# Patient Record
Sex: FEMALE | Race: BLACK OR AFRICAN AMERICAN | NOT HISPANIC OR LATINO | Employment: FULL TIME | URBAN - METROPOLITAN AREA
[De-identification: names, ages, dates, MRNs, and addresses within clinical notes are randomized per-mention and may not be internally consistent; named-entity substitution may affect disease eponyms.]

---

## 2024-02-09 ENCOUNTER — TELEPHONE (OUTPATIENT)
Dept: OBSTETRICS AND GYNECOLOGY | Facility: CLINIC | Age: 24
End: 2024-02-09
Payer: COMMERCIAL

## 2024-02-09 NOTE — TELEPHONE ENCOUNTER
Patient is requesting a refill of Valtex. She is a new patient for Dr. Hazel her appointment is not till 2/22/2024.

## 2024-02-12 ENCOUNTER — PHARMACY VISIT (OUTPATIENT)
Dept: PHARMACY | Facility: CLINIC | Age: 24
End: 2024-02-12
Payer: MEDICAID

## 2024-02-12 DIAGNOSIS — B00.9 HSV INFECTION: Primary | ICD-10-CM

## 2024-02-12 PROCEDURE — RXMED WILLOW AMBULATORY MEDICATION CHARGE

## 2024-02-12 RX ORDER — VALACYCLOVIR HYDROCHLORIDE 500 MG/1
TABLET, FILM COATED ORAL
Qty: 90 TABLET | Refills: 2 | Status: SHIPPED | OUTPATIENT
Start: 2024-02-12 | End: 2025-02-10

## 2024-02-12 NOTE — TELEPHONE ENCOUNTER
Called patient. No answer. Left voicemail. Voicemail stated Dr. Hazel would not refill prescription until she has her appt on the 22nd since she is a new patient but can call [phone] to see if Dr. Anglin would refill.

## 2024-02-22 ENCOUNTER — OFFICE VISIT (OUTPATIENT)
Dept: OBSTETRICS AND GYNECOLOGY | Facility: CLINIC | Age: 24
End: 2024-02-22
Payer: COMMERCIAL

## 2024-02-22 VITALS
SYSTOLIC BLOOD PRESSURE: 110 MMHG | BODY MASS INDEX: 22.82 KG/M2 | HEIGHT: 65 IN | DIASTOLIC BLOOD PRESSURE: 70 MMHG | WEIGHT: 137 LBS

## 2024-02-22 DIAGNOSIS — Z11.3 SCREEN FOR STD (SEXUALLY TRANSMITTED DISEASE): ICD-10-CM

## 2024-02-22 DIAGNOSIS — Z01.419 WELL WOMAN EXAM: Primary | ICD-10-CM

## 2024-02-22 DIAGNOSIS — R10.2 PELVIC PAIN: ICD-10-CM

## 2024-02-22 DIAGNOSIS — Z12.4 CERVICAL CANCER SCREENING: ICD-10-CM

## 2024-02-22 PROCEDURE — 88175 CYTOPATH C/V AUTO FLUID REDO: CPT

## 2024-02-22 PROCEDURE — 87661 TRICHOMONAS VAGINALIS AMPLIF: CPT

## 2024-02-22 PROCEDURE — 99385 PREV VISIT NEW AGE 18-39: CPT | Performed by: OBSTETRICS & GYNECOLOGY

## 2024-02-22 PROCEDURE — 1036F TOBACCO NON-USER: CPT | Performed by: OBSTETRICS & GYNECOLOGY

## 2024-02-22 PROCEDURE — 87800 DETECT AGNT MULT DNA DIREC: CPT

## 2024-02-22 ASSESSMENT — ENCOUNTER SYMPTOMS
SHORTNESS OF BREATH: 0
CONSTIPATION: 0
COLOR CHANGE: 0
SLEEP DISTURBANCE: 0
BACK PAIN: 0
FEVER: 0
NAUSEA: 0
ABDOMINAL PAIN: 0
HEMATURIA: 0
FATIGUE: 0
UNEXPECTED WEIGHT CHANGE: 0
VOMITING: 0
ABDOMINAL DISTENTION: 0
CHILLS: 0
DIARRHEA: 0
APPETITE CHANGE: 0
FLANK PAIN: 0
FREQUENCY: 0
DYSURIA: 0
BLOOD IN STOOL: 0

## 2024-02-22 ASSESSMENT — PAIN SCALES - GENERAL: PAINLEVEL: 0-NO PAIN

## 2024-02-22 NOTE — PROGRESS NOTES
"Abelardo Knapp is a 23 y.o.  here for well woman exam.    Medical and surgical histories reviewed with patient.     Concerns: concerned about LLQ pain with sex.  Has been going on \"for a few months\".  Does not happen every time she has sex.  Maybe positional. Has happened with multiple partners.     Exercise: none     GynHx:  Menarche: 8 yo  Cycles: regular cycles, no concerns   Sexually active: yes with men  Contraception: condoms sometimes   Pap History: denies h/o abnormal pap   STI History: h/o HSV - taking valacyclovir every other day as recommended by a previous provider    No LMP recorded.     Obstetric History  OB History    Para Term  AB Living   0 0 0 0 0 0   SAB IAB Ectopic Multiple Live Births   0 0 0 0 0        Past Medical History  She has a past medical history of Encounter for gynecological examination (general) (routine) without abnormal findings (2022), Encounter for immunization (2015), Encounter for immunization (2016), Encounter for immunization (10/31/2016), Herpes, and Other specified health status (2014).    Surgical History  She has a past surgical history that includes Other surgical history (2019).     Social History  She reports that she has never smoked. She has never used smokeless tobacco. She reports current alcohol use. She reports that she does not currently use drugs.    Family History  No family history on file.     Allergies  Patient has no known allergies.    Review of Systems   Constitutional:  Negative for appetite change, chills, fatigue, fever and unexpected weight change.   Respiratory:  Negative for shortness of breath.    Cardiovascular:  Negative for chest pain.   Gastrointestinal:  Negative for abdominal distention, abdominal pain, blood in stool, constipation, diarrhea, nausea and vomiting.   Endocrine: Negative for cold intolerance and heat intolerance.   Genitourinary:  Positive for pelvic pain (LLQ pain " "intermittently with sex). Negative for dyspareunia, dysuria, flank pain, frequency, genital sores, hematuria, menstrual problem, urgency, vaginal bleeding, vaginal discharge and vaginal pain.   Musculoskeletal:  Negative for back pain.   Skin:  Negative for color change.   Psychiatric/Behavioral:  Negative for sleep disturbance.        /70   Ht 1.651 m (5' 5\")   Wt 62.1 kg (137 lb)   BMI 22.80 kg/m²      Physical Exam  Constitutional:       Appearance: Normal appearance.   HENT:      Head: Normocephalic and atraumatic.   Chest:   Breasts:     Right: Normal.      Left: Normal.   Abdominal:      General: Abdomen is flat.      Palpations: Abdomen is soft.      Tenderness: There is no abdominal tenderness.   Genitourinary:     General: Normal vulva.      Vagina: Normal.      Cervix: Normal.      Uterus: Normal.       Adnexa: Right adnexa normal and left adnexa normal.      Comments: Pap collected today    Skin:     General: Skin is warm and dry.   Neurological:      Mental Status: She is alert and oriented to person, place, and time.   Psychiatric:         Mood and Affect: Mood normal.           Assessment and Plan:  Routine Well Woman Exam Today.   Discussed diet and exercise and routine health screening.   Pap: pap done today   GC/CT testing with pap smear    Contraception  Pt previously used depo provera injections. Declines to discuss options for birth control. Happy with condoms.     LLQ pain  TATV US ordered  No pain or significant findings on exam. Discussed possibilities with patient (possibly GI related or associated with certain time in menstrual cycle).  Likely not a major concern.     No orders of the defined types were placed in this encounter.     "

## 2024-02-24 LAB
C TRACH RRNA SPEC QL NAA+PROBE: NEGATIVE
N GONORRHOEA DNA SPEC QL PROBE+SIG AMP: NEGATIVE
T VAGINALIS RRNA SPEC QL NAA+PROBE: NEGATIVE

## 2024-03-05 LAB
CYTOLOGY CMNT CVX/VAG CYTO-IMP: NORMAL
LAB AP HPV GENOTYPE QUESTION: YES
LAB AP HPV HR: NORMAL
LAB AP PAP ADDITIONAL TESTS: NORMAL
LABORATORY COMMENT REPORT: NORMAL
PATH REPORT.TOTAL CANCER: NORMAL

## 2024-03-13 ENCOUNTER — HOSPITAL ENCOUNTER (OUTPATIENT)
Dept: RADIOLOGY | Facility: HOSPITAL | Age: 24
Discharge: HOME | End: 2024-03-13
Payer: COMMERCIAL

## 2024-03-13 DIAGNOSIS — R10.2 PELVIC PAIN: ICD-10-CM

## 2024-03-13 PROCEDURE — 76856 US EXAM PELVIC COMPLETE: CPT

## 2024-03-13 PROCEDURE — 76830 TRANSVAGINAL US NON-OB: CPT | Performed by: STUDENT IN AN ORGANIZED HEALTH CARE EDUCATION/TRAINING PROGRAM

## 2024-03-13 PROCEDURE — 76856 US EXAM PELVIC COMPLETE: CPT | Performed by: STUDENT IN AN ORGANIZED HEALTH CARE EDUCATION/TRAINING PROGRAM

## 2024-03-15 ENCOUNTER — TELEPHONE (OUTPATIENT)
Dept: OBSTETRICS AND GYNECOLOGY | Facility: CLINIC | Age: 24
End: 2024-03-15
Payer: COMMERCIAL

## 2024-03-15 NOTE — TELEPHONE ENCOUNTER
Called patient to discuss results.  Identified by name and .  Informed patient of her results. Patient expressed understanding, no questions or concerns at this time.  Encouraged to call the office should any arise.     COYD Diaz RN

## 2024-03-15 NOTE — TELEPHONE ENCOUNTER
Called patient to discuss results  Left vm for pt to return call    CODY Diaz RN      ----- Message from Heather LEA MD sent at 3/14/2024 10:17 PM EDT -----  Please let patient know she had a normal pelvic ultrasound. No reason for concerns.  Pap and STI testing also normal. Thanks.

## 2024-05-05 PROCEDURE — RXMED WILLOW AMBULATORY MEDICATION CHARGE

## 2024-05-10 ENCOUNTER — PHARMACY VISIT (OUTPATIENT)
Dept: PHARMACY | Facility: CLINIC | Age: 24
End: 2024-05-10
Payer: MEDICAID

## 2024-08-05 PROCEDURE — RXMED WILLOW AMBULATORY MEDICATION CHARGE

## 2024-08-08 ENCOUNTER — PHARMACY VISIT (OUTPATIENT)
Dept: PHARMACY | Facility: CLINIC | Age: 24
End: 2024-08-08
Payer: MEDICAID

## 2024-09-23 ENCOUNTER — TELEPHONE (OUTPATIENT)
Dept: OBSTETRICS AND GYNECOLOGY | Facility: CLINIC | Age: 24
End: 2024-09-23
Payer: COMMERCIAL

## 2024-09-23 NOTE — TELEPHONE ENCOUNTER
Called patient to discuss symptoms she is experiencing  Left vm for patient to return call.    NHI DiazN RN

## 2024-09-23 NOTE — TELEPHONE ENCOUNTER
Patient has an upcoming appt. for missed period come late october. Patient took two pregnancy test yesterday and recieved a postivie result and now she has a fever, runny nose, body aches and not sure how to treat it. Patient has not taken her temp. Or hasn't had a covid test. [facility] pharmacy [phone]

## 2024-09-23 NOTE — TELEPHONE ENCOUNTER
Lm to inform the pt that I've received her message to schedule an appt with  and address her other concerns. Asked the pt to call the office.

## 2024-10-23 ENCOUNTER — APPOINTMENT (OUTPATIENT)
Dept: OBSTETRICS AND GYNECOLOGY | Facility: CLINIC | Age: 24
End: 2024-10-23
Payer: COMMERCIAL

## 2024-10-23 ENCOUNTER — APPOINTMENT (OUTPATIENT)
Dept: LAB | Facility: LAB | Age: 24
End: 2024-10-23
Payer: COMMERCIAL

## 2024-10-23 VITALS
WEIGHT: 147 LBS | SYSTOLIC BLOOD PRESSURE: 104 MMHG | HEIGHT: 64 IN | BODY MASS INDEX: 25.1 KG/M2 | DIASTOLIC BLOOD PRESSURE: 60 MMHG

## 2024-10-23 DIAGNOSIS — N92.6 MISSED PERIOD: Primary | ICD-10-CM

## 2024-10-23 DIAGNOSIS — Z34.00 SUPERVISION OF NORMAL FIRST PREGNANCY, ANTEPARTUM (HHS-HCC): ICD-10-CM

## 2024-10-23 PROBLEM — Z3A.08 8 WEEKS GESTATION OF PREGNANCY (HHS-HCC): Status: ACTIVE | Noted: 2024-10-23

## 2024-10-23 PROBLEM — B00.9 HSV-2 INFECTION: Status: ACTIVE | Noted: 2024-10-23

## 2024-10-23 LAB — PREGNANCY TEST URINE, POC: POSITIVE

## 2024-10-23 PROCEDURE — 87086 URINE CULTURE/COLONY COUNT: CPT

## 2024-10-23 PROCEDURE — 87491 CHLMYD TRACH DNA AMP PROBE: CPT

## 2024-10-23 PROCEDURE — 99214 OFFICE O/P EST MOD 30 MIN: CPT | Performed by: OBSTETRICS & GYNECOLOGY

## 2024-10-23 PROCEDURE — 3008F BODY MASS INDEX DOCD: CPT | Performed by: OBSTETRICS & GYNECOLOGY

## 2024-10-23 PROCEDURE — 87661 TRICHOMONAS VAGINALIS AMPLIF: CPT

## 2024-10-23 PROCEDURE — 87591 N.GONORRHOEAE DNA AMP PROB: CPT

## 2024-10-23 PROCEDURE — 1036F TOBACCO NON-USER: CPT | Performed by: OBSTETRICS & GYNECOLOGY

## 2024-10-23 PROCEDURE — 81025 URINE PREGNANCY TEST: CPT | Performed by: OBSTETRICS & GYNECOLOGY

## 2024-10-23 ASSESSMENT — ENCOUNTER SYMPTOMS
ABDOMINAL PAIN: 0
DIARRHEA: 0
BLOOD IN STOOL: 0
CONSTIPATION: 0
UNEXPECTED WEIGHT CHANGE: 0
SLEEP DISTURBANCE: 0
FLANK PAIN: 0
FATIGUE: 0
FREQUENCY: 0
SHORTNESS OF BREATH: 0
FEVER: 0
APPETITE CHANGE: 0
VOMITING: 0
NAUSEA: 1
DYSURIA: 0
ABDOMINAL DISTENTION: 0
HEMATURIA: 0
COLOR CHANGE: 0
CHILLS: 0
BACK PAIN: 0

## 2024-10-23 ASSESSMENT — EDINBURGH POSTNATAL DEPRESSION SCALE (EPDS)
I HAVE BEEN ANXIOUS OR WORRIED FOR NO GOOD REASON: NO, NOT AT ALL
I HAVE LOOKED FORWARD WITH ENJOYMENT TO THINGS: AS MUCH AS I EVER DID
TOTAL SCORE: 0
I HAVE BEEN ABLE TO LAUGH AND SEE THE FUNNY SIDE OF THINGS: AS MUCH AS I ALWAYS COULD
I HAVE BEEN SO UNHAPPY THAT I HAVE BEEN CRYING: NO, NEVER
THE THOUGHT OF HARMING MYSELF HAS OCCURRED TO ME: NEVER
THINGS HAVE BEEN GETTING ON TOP OF ME: NO, I HAVE BEEN COPING AS WELL AS EVER
I HAVE BLAMED MYSELF UNNECESSARILY WHEN THINGS WENT WRONG: NO, NEVER
I HAVE BEEN SO UNHAPPY THAT I HAVE HAD DIFFICULTY SLEEPING: NOT AT ALL
I HAVE FELT SAD OR MISERABLE: NO, NOT AT ALL
I HAVE FELT SCARED OR PANICKY FOR NO GOOD REASON: NO, NOT AT ALL

## 2024-10-23 ASSESSMENT — PAIN SCALES - GENERAL: PAINLEVEL_OUTOF10: 0-NO PAIN

## 2024-10-23 NOTE — ASSESSMENT & PLAN NOTE
Desired provider in labor: [] CNM  [x] Physician  [] Blood Products: [] Yes, accepts [] No, needs counseling  [x] Initial BMI: Could not be calculated   [] Prenatal Labs:   [] Cervical Cancer Screening up to date  [x] Rh status: A+  [] Genetic Screening:    [] NT US: (11-13 wks)  [] Baby ASA (if indicated):  [] Pregnancy dated by:  LMP agrees with 8 week US    [] Anatomy US: (19-20 wks)  [] Federal Sterilization consent signed (if indicated):  [] 1hr GCT at 24-28wks:  [] Rhogam (if indicated):   [] Fetal Surveillance (if indicated):  [] Tdap (27-32 wks, may be given up to 36 wks if initial window missed):   [] RSV (32-36 wks) (Sept. to end of Jan):   [] Flu Vaccine:    [] Breastfeeding:  [] Postpartum Birth control method:   [] GBS at 36 - 37 wks:  [] 39 weeks discussion of IOL vs. Expectant management:  [] Mode of delivery ( anticipated ):

## 2024-10-23 NOTE — PROGRESS NOTES
Subjective   Patient ID [id number]   Abelardo Knapp is a 24 y.o.  at 8w4d with a working estimated date of delivery of 2025, by Last Menstrual Period who presents for an initial prenatal visit.   Pt has some nausea. No vomiting.  Had some spotting a few days ago and reports no significant findings during [facility] ED visit. (Blood type A+)    Her pregnancy is complicated by:  HSV - currently using valtrex intermittently for outbreaks     OB History    Para Term  AB Living   1 0 0 0 0 0   SAB IAB Ectopic Multiple Live Births   0 0 0 0 0      # Outcome Date GA Lbr Aron/2nd Weight Sex Type Anes PTL Lv   1 Current              [place]  Depression Scale Total: 0    Objective   Physical Exam  Weight: 66.7 kg (147 lb)  Expected Total Weight Gain: 11.5 kg (25 lb)-16 kg (35 lb)   Pregravid BMI: 23.50  BP: 104/60    Fetal Heart Rate: +     Review of Systems   Constitutional:  Negative for appetite change, chills, fatigue, fever and unexpected weight change.   Respiratory:  Negative for shortness of breath.    Cardiovascular:  Negative for chest pain.   Gastrointestinal:  Positive for nausea. Negative for abdominal distention, abdominal pain, blood in stool, constipation, diarrhea and vomiting.   Endocrine: Negative for cold intolerance and heat intolerance.   Genitourinary:  Negative for dyspareunia, dysuria, flank pain, frequency, genital sores, hematuria, menstrual problem, pelvic pain, urgency, vaginal bleeding, vaginal discharge and vaginal pain.   Musculoskeletal:  Negative for back pain.   Skin:  Negative for color change.   Psychiatric/Behavioral:  Negative for sleep disturbance.    '  Physical Exam  Constitutional:       Appearance: Normal appearance.   Abdominal:      General: Abdomen is flat.      Palpations: Abdomen is soft.      Tenderness: There is no abdominal tenderness.   Genitourinary:     General: Normal vulva.   Skin:     General: Skin is warm and dry.   Neurological:       Mental Status: She is alert.   Psychiatric:         Mood and Affect: Mood normal.         Behavior: Behavior normal.       TATV US in office:  -gestational sac in uterus  -yolk sac visualized and wnl  -fetal pole present and cardiac activity subjectively normal   -CRL c/w 9w 0d     Prenatal Labs  ordered    Assessment/Plan         Prenatal Labs ordered  Continue daily PNV  Pt would like NIPS and NT US - both are ordered and patient given instruction on how to schedule  Discussed diet, exercise, office visits, normal OB symptoms    Follow up in 4 weeks for return OB visit.

## 2024-10-24 LAB
BACTERIA UR CULT: NO GROWTH
C TRACH RRNA SPEC QL NAA+PROBE: NEGATIVE
N GONORRHOEA DNA SPEC QL PROBE+SIG AMP: NEGATIVE
T VAGINALIS RRNA SPEC QL NAA+PROBE: NEGATIVE

## 2024-11-05 ENCOUNTER — LAB (OUTPATIENT)
Dept: LAB | Facility: LAB | Age: 24
End: 2024-11-05
Payer: COMMERCIAL

## 2024-11-05 DIAGNOSIS — Z34.00 SUPERVISION OF NORMAL FIRST PREGNANCY, ANTEPARTUM (HHS-HCC): ICD-10-CM

## 2024-11-05 LAB
ERYTHROCYTE [DISTWIDTH] IN BLOOD BY AUTOMATED COUNT: 12.3 % (ref 11.5–14.5)
HCT VFR BLD AUTO: 38.6 % (ref 36–46)
HGB BLD-MCNC: 13.4 G/DL (ref 12–16)
MCH RBC QN AUTO: 30.6 PG (ref 26–34)
MCHC RBC AUTO-ENTMCNC: 34.7 G/DL (ref 32–36)
MCV RBC AUTO: 88 FL (ref 80–100)
NRBC BLD-RTO: 0 /100 WBCS (ref 0–0)
PLATELET # BLD AUTO: 195 X10*3/UL (ref 150–450)
RBC # BLD AUTO: 4.38 X10*6/UL (ref 4–5.2)
WBC # BLD AUTO: 5.6 X10*3/UL (ref 4.4–11.3)

## 2024-11-05 PROCEDURE — 86901 BLOOD TYPING SEROLOGIC RH(D): CPT

## 2024-11-05 PROCEDURE — 83021 HEMOGLOBIN CHROMOTOGRAPHY: CPT

## 2024-11-05 PROCEDURE — 86803 HEPATITIS C AB TEST: CPT

## 2024-11-05 PROCEDURE — 87340 HEPATITIS B SURFACE AG IA: CPT

## 2024-11-05 PROCEDURE — 86850 RBC ANTIBODY SCREEN: CPT

## 2024-11-05 PROCEDURE — 87389 HIV-1 AG W/HIV-1&-2 AB AG IA: CPT

## 2024-11-05 PROCEDURE — 83020 HEMOGLOBIN ELECTROPHORESIS: CPT | Performed by: OBSTETRICS & GYNECOLOGY

## 2024-11-05 PROCEDURE — 36415 COLL VENOUS BLD VENIPUNCTURE: CPT

## 2024-11-05 PROCEDURE — 86780 TREPONEMA PALLIDUM: CPT

## 2024-11-05 PROCEDURE — 85027 COMPLETE CBC AUTOMATED: CPT

## 2024-11-05 PROCEDURE — 86317 IMMUNOASSAY INFECTIOUS AGENT: CPT

## 2024-11-05 PROCEDURE — 86900 BLOOD TYPING SEROLOGIC ABO: CPT

## 2024-11-06 LAB
ABO GROUP (TYPE) IN BLOOD: NORMAL
ANTIBODY SCREEN: NORMAL
HBV SURFACE AG SERPL QL IA: NONREACTIVE
HCV AB SER QL: NONREACTIVE
HEMOGLOBIN A2: 3 % (ref 2–3.5)
HEMOGLOBIN A: 96.7 % (ref 95.8–98)
HEMOGLOBIN F: 0.3 % (ref 0–2)
HEMOGLOBIN IDENTIFICATION INTERPRETATION: NORMAL
HIV 1+2 AB+HIV1 P24 AG SERPL QL IA: NONREACTIVE
PATH REVIEW-HGB IDENTIFICATION: NORMAL
RH FACTOR (ANTIGEN D): NORMAL
RUBV IGG SERPL IA-ACNC: 3.4 IA
RUBV IGG SERPL QL IA: POSITIVE
TREPONEMA PALLIDUM IGG+IGM AB [PRESENCE] IN SERUM OR PLASMA BY IMMUNOASSAY: NONREACTIVE

## 2024-11-13 LAB
COMMENTS - MP RESULT TYPE: NORMAL
SCAN RESULT: NORMAL

## 2024-11-20 ENCOUNTER — APPOINTMENT (OUTPATIENT)
Dept: OBSTETRICS AND GYNECOLOGY | Facility: CLINIC | Age: 24
End: 2024-11-20
Payer: COMMERCIAL

## 2024-12-02 ENCOUNTER — APPOINTMENT (OUTPATIENT)
Dept: OBSTETRICS AND GYNECOLOGY | Facility: CLINIC | Age: 24
End: 2024-12-02
Payer: COMMERCIAL

## 2024-12-02 VITALS — SYSTOLIC BLOOD PRESSURE: 120 MMHG | DIASTOLIC BLOOD PRESSURE: 60 MMHG | WEIGHT: 151.6 LBS | BODY MASS INDEX: 26.02 KG/M2

## 2024-12-02 DIAGNOSIS — Z34.00 SUPERVISION OF NORMAL FIRST PREGNANCY, ANTEPARTUM (HHS-HCC): ICD-10-CM

## 2024-12-02 DIAGNOSIS — N89.8 VAGINAL ITCHING: ICD-10-CM

## 2024-12-02 DIAGNOSIS — Z3A.14 14 WEEKS GESTATION OF PREGNANCY (HHS-HCC): Primary | ICD-10-CM

## 2024-12-02 PROCEDURE — RXMED WILLOW AMBULATORY MEDICATION CHARGE

## 2024-12-02 PROCEDURE — 90471 IMMUNIZATION ADMIN: CPT | Performed by: OBSTETRICS & GYNECOLOGY

## 2024-12-02 PROCEDURE — 90656 IIV3 VACC NO PRSV 0.5 ML IM: CPT | Performed by: OBSTETRICS & GYNECOLOGY

## 2024-12-02 PROCEDURE — 99213 OFFICE O/P EST LOW 20 MIN: CPT | Performed by: OBSTETRICS & GYNECOLOGY

## 2024-12-02 PROCEDURE — 87205 SMEAR GRAM STAIN: CPT

## 2024-12-02 RX ORDER — NAPROXEN SODIUM 220 MG/1
162 TABLET, FILM COATED ORAL DAILY
Qty: 180 TABLET | Refills: 3 | Status: SHIPPED | OUTPATIENT
Start: 2024-12-02 | End: 2025-12-02

## 2024-12-02 RX ORDER — ONDANSETRON 4 MG/1
4 TABLET, FILM COATED ORAL EVERY 8 HOURS PRN
Qty: 15 TABLET | Refills: 1 | Status: SHIPPED | OUTPATIENT
Start: 2024-12-02

## 2024-12-02 NOTE — PROGRESS NOTES
Pt currently 14w2d  Pt continues to have nausea but no vomiting.  Occasional headaches, using tylenol.  Some vaginal itching and discharge, concern for yeast infection.  Normal fetal movement. Denies ctx, LOF, VB.     Problem List Items Addressed This Visit          Active Problems    14 weeks gestation of pregnancy ([facility])     Other Visit Diagnoses       Vaginal itching    -  Primary    Relevant Orders    Vaginitis Gram Stain For Bacterial Vaginosis + Yeast    Supervision of normal first pregnancy, antepartum ([facility])        Relevant Medications    ondansetron (Zofran) 4 mg tablet    aspirin 81 mg chewable tablet    Other Relevant Orders    US MAC OB imaging order

## 2024-12-03 ENCOUNTER — TELEPHONE (OUTPATIENT)
Dept: OBSTETRICS AND GYNECOLOGY | Facility: CLINIC | Age: 24
End: 2024-12-03
Payer: COMMERCIAL

## 2024-12-03 ENCOUNTER — PHARMACY VISIT (OUTPATIENT)
Dept: PHARMACY | Facility: CLINIC | Age: 24
End: 2024-12-03
Payer: MEDICAID

## 2024-12-03 LAB
CLUE CELLS VAG LPF-#/AREA: PRESENT /[LPF]
NUGENT SCORE: 8
YEAST VAG WET PREP-#/AREA: PRESENT

## 2024-12-03 NOTE — TELEPHONE ENCOUNTER
Called patient to discuss results and Zeushart message  Left vm for patient to return call.    NHI DiazN RN

## 2024-12-03 NOTE — TELEPHONE ENCOUNTER
Called patient to discuss results  Left vm for patient to return call.    CODY Diaz RN    ----- Message from Heather Hazel sent at 12/3/2024 11:43 AM EST -----  Pt testing positive for yeast and bacteria.  Will send rx for fluconazole for yeast. Can you see if patient would prefer oral or vaginal metronidazole?  (She is pregnant and both are safe)  Thanks

## 2024-12-04 ENCOUNTER — TELEPHONE (OUTPATIENT)
Dept: OBSTETRICS AND GYNECOLOGY | Facility: CLINIC | Age: 24
End: 2024-12-04
Payer: COMMERCIAL

## 2024-12-04 DIAGNOSIS — N89.8 VAGINAL DISCHARGE: ICD-10-CM

## 2024-12-04 NOTE — TELEPHONE ENCOUNTER
Called patient to discuss results and follow up  Informed patient via vm about results and discussed tx options, number given to return call  Left vm for patient to return call.    NHI DiazN RN     Please disregard this encounter. She is no longer on Warfarin per documentation. She was prescribed Eliquis on 9/16/22.

## 2024-12-05 ENCOUNTER — PHARMACY VISIT (OUTPATIENT)
Dept: PHARMACY | Facility: CLINIC | Age: 24
End: 2024-12-05
Payer: MEDICAID

## 2024-12-05 DIAGNOSIS — B37.9 YEAST INFECTION: Primary | ICD-10-CM

## 2024-12-05 PROCEDURE — RXMED WILLOW AMBULATORY MEDICATION CHARGE

## 2024-12-05 RX ORDER — METRONIDAZOLE 500 MG/1
500 TABLET ORAL 2 TIMES DAILY
Qty: 14 TABLET | Refills: 0 | Status: SHIPPED | OUTPATIENT
Start: 2024-12-05 | End: 2024-12-12

## 2024-12-05 RX ORDER — FLUCONAZOLE 150 MG/1
150 TABLET ORAL ONCE
Qty: 2 TABLET | Refills: 0 | Status: SHIPPED | OUTPATIENT
Start: 2024-12-05 | End: 2024-12-06

## 2024-12-30 ENCOUNTER — APPOINTMENT (OUTPATIENT)
Dept: OBSTETRICS AND GYNECOLOGY | Facility: CLINIC | Age: 24
End: 2024-12-30
Payer: COMMERCIAL

## 2024-12-30 VITALS — BODY MASS INDEX: 27.46 KG/M2 | SYSTOLIC BLOOD PRESSURE: 120 MMHG | DIASTOLIC BLOOD PRESSURE: 70 MMHG | WEIGHT: 160 LBS

## 2024-12-30 DIAGNOSIS — O99.612 CONSTIPATION DURING PREGNANCY IN SECOND TRIMESTER (HHS-HCC): ICD-10-CM

## 2024-12-30 DIAGNOSIS — K59.00 CONSTIPATION DURING PREGNANCY IN SECOND TRIMESTER (HHS-HCC): ICD-10-CM

## 2024-12-30 DIAGNOSIS — Z3A.18 18 WEEKS GESTATION OF PREGNANCY (HHS-HCC): Primary | ICD-10-CM

## 2024-12-30 DIAGNOSIS — B00.9 HSV (HERPES SIMPLEX VIRUS) INFECTION: ICD-10-CM

## 2024-12-30 PROCEDURE — 99213 OFFICE O/P EST LOW 20 MIN: CPT | Performed by: OBSTETRICS & GYNECOLOGY

## 2024-12-30 RX ORDER — DOCUSATE SODIUM 100 MG/1
100 CAPSULE, LIQUID FILLED ORAL 2 TIMES DAILY PRN
Qty: 60 CAPSULE | Refills: 4 | Status: SHIPPED | OUTPATIENT
Start: 2024-12-30

## 2024-12-30 NOTE — PROGRESS NOTES
Pt currently 18w2d  Pt has no complaints.    Started feeling movement last week.   Denies ctx, LOF, VB.     Problem List Items Addressed This Visit          Active Problems    HSV (herpes simplex virus) infection    18 weeks gestation of pregnancy ([facility]) - Primary     Desired provider in labor: [] CNM  [] Physician  [] Blood Products: [] Yes, accepts [] No, needs counseling  [x] Initial BMI: 23.50   [x] Prenatal Labs:  normal  [] Cervical Cancer Screening up to date  [x] Rh status: positive  [x] Genetic Screening:  NIPS wnl, sex = female   [] NT US: (11-13 wks): missed scheduling   [x] Baby ASA (if indicated): start 12/2  [x] Pregnancy dated by:  LMP and early US    [] Anatomy US: (19-20 wks)  [] Federal Sterilization consent signed (if indicated):  [] 1hr GCT at 24-28wks:  [] Rhogam (if indicated):   [] Fetal Surveillance (if indicated):  [] Tdap (27-32 wks, may be given up to 36 wks if initial window missed):   [] RSV (32-36 wks) (Sept. to end of Jan):   [x] Flu Vaccine: accepted 12/2/24    [] Breastfeeding:  [] Postpartum Birth control method:   [] GBS at 36 - 37 wks:  [] 39 weeks discussion of IOL vs. Expectant management:  [] Mode of delivery ( anticipated ):         Constipation during pregnancy in second trimester ([facility])    Relevant Medications    docusate sodium (Colace) 100 mg capsule     Anatomy US scheduled 1/8/25

## 2024-12-30 NOTE — ASSESSMENT & PLAN NOTE
Desired provider in labor: [] CNM  [] Physician  [] Blood Products: [] Yes, accepts [] No, needs counseling  [x] Initial BMI: 23.50   [x] Prenatal Labs:  normal  [] Cervical Cancer Screening up to date  [x] Rh status: positive  [x] Genetic Screening:  NIPS wnl, sex = female   [] NT US: (11-13 wks): missed scheduling   [x] Baby ASA (if indicated): start 12/2  [x] Pregnancy dated by:  LMP and early US    [] Anatomy US: (19-20 wks)  [] Federal Sterilization consent signed (if indicated):  [] 1hr GCT at 24-28wks:  [] Rhogam (if indicated):   [] Fetal Surveillance (if indicated):  [] Tdap (27-32 wks, may be given up to 36 wks if initial window missed):   [] RSV (32-36 wks) (Sept. to end of Jan):   [x] Flu Vaccine: accepted 12/2/24    [] Breastfeeding:  [] Postpartum Birth control method:   [] GBS at 36 - 37 wks:  [] 39 weeks discussion of IOL vs. Expectant management:  [] Mode of delivery ( anticipated ):

## 2025-01-08 ENCOUNTER — HOSPITAL ENCOUNTER (OUTPATIENT)
Dept: RADIOLOGY | Facility: HOSPITAL | Age: 25
Discharge: HOME | End: 2025-01-08
Payer: COMMERCIAL

## 2025-01-08 DIAGNOSIS — Z03.73 FETAL ANOMALY SUSPECTED BUT NOT FOUND: ICD-10-CM

## 2025-01-08 DIAGNOSIS — Z34.00 SUPERVISION OF NORMAL FIRST PREGNANCY, ANTEPARTUM (HHS-HCC): ICD-10-CM

## 2025-01-08 PROCEDURE — RXMED WILLOW AMBULATORY MEDICATION CHARGE

## 2025-01-08 PROCEDURE — 76805 OB US >/= 14 WKS SNGL FETUS: CPT | Performed by: STUDENT IN AN ORGANIZED HEALTH CARE EDUCATION/TRAINING PROGRAM

## 2025-01-08 PROCEDURE — 76805 OB US >/= 14 WKS SNGL FETUS: CPT

## 2025-01-09 ENCOUNTER — PHARMACY VISIT (OUTPATIENT)
Dept: PHARMACY | Facility: CLINIC | Age: 25
End: 2025-01-09
Payer: MEDICAID

## 2025-01-13 ENCOUNTER — TELEPHONE (OUTPATIENT)
Dept: OBSTETRICS AND GYNECOLOGY | Facility: CLINIC | Age: 25
End: 2025-01-13
Payer: COMMERCIAL

## 2025-01-13 NOTE — TELEPHONE ENCOUNTER
Called patient to discuss symptoms she is experiencing  Identified by name and   Inquired about patient's symptoms  Tension in R leg, R leg is more swollen than left   Swelling is more recent  Back pain was so bad yesterday she couldn't sit down  Been having some mild shortness or breath, only really when she feels like she is doing too much  Advised patient go to ER or L&D triage for evaluation to rule out any concerns  Patient states she may wait until after work but if pain gets bad or SOB worsens she will go  Advised that patient go sooner than later for evaluation and to have someone drive her  Patient verbalized understanding, all questions/concerns addressed at this time.    Louisa Díaz, NHIN RN

## 2025-01-20 ENCOUNTER — HOSPITAL ENCOUNTER (OUTPATIENT)
Facility: HOSPITAL | Age: 25
End: 2025-01-20
Attending: OBSTETRICS & GYNECOLOGY | Admitting: OBSTETRICS & GYNECOLOGY
Payer: COMMERCIAL

## 2025-01-20 ENCOUNTER — HOSPITAL ENCOUNTER (OUTPATIENT)
Facility: HOSPITAL | Age: 25
Discharge: HOME | End: 2025-01-20
Attending: OBSTETRICS & GYNECOLOGY | Admitting: OBSTETRICS & GYNECOLOGY
Payer: COMMERCIAL

## 2025-01-20 VITALS
OXYGEN SATURATION: 100 % | HEART RATE: 83 BPM | WEIGHT: 175.93 LBS | BODY MASS INDEX: 30.04 KG/M2 | TEMPERATURE: 98.1 F | RESPIRATION RATE: 16 BRPM | SYSTOLIC BLOOD PRESSURE: 123 MMHG | HEIGHT: 64 IN | DIASTOLIC BLOOD PRESSURE: 68 MMHG

## 2025-01-20 DIAGNOSIS — O12.02 SWELLING OF LOWER EXTREMITY DURING PREGNANCY IN SECOND TRIMESTER (HHS-HCC): Primary | ICD-10-CM

## 2025-01-20 LAB
POC APPEARANCE, URINE: CLEAR
POC BILIRUBIN, URINE: NEGATIVE
POC BLOOD, URINE: NEGATIVE
POC COLOR, URINE: YELLOW
POC GLUCOSE, URINE: NEGATIVE MG/DL
POC KETONES, URINE: NEGATIVE MG/DL
POC LEUKOCYTES, URINE: ABNORMAL
POC NITRITE,URINE: NEGATIVE
POC PH, URINE: 6 PH
POC PROTEIN, URINE: NEGATIVE MG/DL
POC SPECIFIC GRAVITY, URINE: 1.02
POC UROBILINOGEN, URINE: 0.2 EU/DL

## 2025-01-20 PROCEDURE — 99214 OFFICE O/P EST MOD 30 MIN: CPT

## 2025-01-20 PROCEDURE — 81002 URINALYSIS NONAUTO W/O SCOPE: CPT | Performed by: FAMILY MEDICINE

## 2025-01-20 PROCEDURE — 4500999001 HC ED NO CHARGE

## 2025-01-20 PROCEDURE — 99213 OFFICE O/P EST LOW 20 MIN: CPT | Performed by: FAMILY MEDICINE

## 2025-01-20 RX ORDER — LIDOCAINE HYDROCHLORIDE 10 MG/ML
0.5 INJECTION, SOLUTION INFILTRATION; PERINEURAL ONCE AS NEEDED
Status: DISCONTINUED | OUTPATIENT
Start: 2025-01-20 | End: 2025-01-20 | Stop reason: HOSPADM

## 2025-01-20 RX ORDER — HYDRALAZINE HYDROCHLORIDE 20 MG/ML
5 INJECTION INTRAMUSCULAR; INTRAVENOUS ONCE AS NEEDED
Status: DISCONTINUED | OUTPATIENT
Start: 2025-01-20 | End: 2025-01-20 | Stop reason: HOSPADM

## 2025-01-20 RX ORDER — NIFEDIPINE 10 MG/1
10 CAPSULE ORAL ONCE AS NEEDED
Status: DISCONTINUED | OUTPATIENT
Start: 2025-01-20 | End: 2025-01-20 | Stop reason: HOSPADM

## 2025-01-20 RX ORDER — LABETALOL HYDROCHLORIDE 5 MG/ML
20 INJECTION, SOLUTION INTRAVENOUS ONCE AS NEEDED
Status: DISCONTINUED | OUTPATIENT
Start: 2025-01-20 | End: 2025-01-20 | Stop reason: HOSPADM

## 2025-01-20 ASSESSMENT — PAIN SCALES - GENERAL: PAINLEVEL_OUTOF10: 0 - NO PAIN

## 2025-01-20 NOTE — H&P
OB Triage H&P    ASSESSMENT/PLAN    Abelardo Knapp is a 24 y.o.  at 21w2d, dated by LMP c/w 8w4d US. Patient receives prenatal care with Dr Hazel. She presents to OB triage with LE swelling.    Intermittent BLE swelling, numbness/tingling in hands  - equal edema in BLE after long shifts at work  - low c/f DVT in absence of erythema, pain, warmth, fevers and intermittent sx which worsen with prolonged standing/walking  - VSS, SpO2 100% on RA  - discussed adequate hydration, use of compression stockings and consideration of wrist splints  - order for compression stockings and belly band placed  - discussed warning s/sx to return to triage or ED    IUP at 21w2d  -   - good fetal movement  - continue routine prenatal care  - next appt  anatomy scan,  PNV    Pregnancy n/f  - HSV    Dispo  -Patient appropriate for discharge home, agrees with plan  -Return precautions discussed   -Follow up at next scheduled OB appointment or to triage sooner as needed    -Discussed plan and reviewed with: Dr Hein      SUBJECTIVE  Abelardo Knapp presents for intermittent LE swelling x1 week. She notes the swelling is the worst after prolonged standing/walking at her job. Denies CP/SOB. Denies history of blood clots. Recently traveled via car to [place] about 2-3 wks ago. Denies pain, erythema, warmth, fever/chills. She also has been experiencing intermittent hand numbness/tingling, worse in the morning.    Good fetal movement. Denies vaginal bleeding., Denies contractions., Denies leaking of fluid.      Prenatal Provider Dr Hazel      Pregnancy Problems (from 10/23/24 to present)       Problem Noted Diagnosed Resolved    Constipation during pregnancy in second trimester ([facility]) 2024 by Heather LEA MD  No    Priority:  Medium       18 weeks gestation of pregnancy ([facility]) 10/23/2024 by Heather LEA MD  No    Priority:  Medium               OB History    Para Term  AB  Living   1 0 0 0 0 0   SAB IAB Ectopic Multiple Live Births   0 0 0 0 0      # Outcome Date GA Lbr Aron/2nd Weight Sex Type Anes PTL Lv   1 Current                Past Surgical History:   Procedure Laterality Date    OTHER SURGICAL HISTORY  08/08/2019    No history of surgery       Social History     Tobacco Use    Smoking status: Never    Smokeless tobacco: Never   Substance Use Topics    Alcohol use: Yes     Comment: occasionally       No Known Allergies    Medications Prior to Admission   Medication Sig Dispense Refill Last Dose/Taking    aspirin 81 mg chewable tablet Chew 2 tablets (162 mg) once daily. 180 tablet 3 1/19/2025 Evening    prenatal vitamin, iron-folic, 27 mg iron-800 mcg folic acid tablet Take 1 tablet by mouth once daily. 90 tablet 2 1/19/2025 Evening    valACYclovir (Valtrex) 500 mg tablet TAKE 1 TABLET TWICE DAILY FOR 3 DAYS DURING OUTBREAKS FOLLOWED BY 1 PILL DAILY 90 tablet 2 1/19/2025 Evening    docusate sodium (Colace) 100 mg capsule Take 1 capsule (100 mg) by mouth 2 times a day as needed for constipation. 60 capsule 4 Unknown    ondansetron (Zofran) 4 mg tablet Take 1 tablet (4 mg) by mouth every 8 hours if needed for nausea or vomiting. 15 tablet 1 Unknown    PRENATAL 2-IRON-FOLIC ACID-OM3 ORAL Take by mouth.   Unknown       OBJECTIVE    Last Vitals  Temp Pulse Resp BP MAP O2 Sat     90 16 130/78   100 %       Physical Exam  Physical Exam  Constitutional:       Appearance: Normal appearance.   HENT:      Head: Normocephalic.   Musculoskeletal:      Comments: No tenderness to palpation, warmth, redness, pitting edema in lower extremities  Normal pedal pulses, sensation intact   Neurological:      Mental Status: She is alert and oriented to person, place, and time.       Fetal Monitoring  FHT - 150bpm per doppler by RN    LABS  Labs in chart were reviewed.

## 2025-01-23 ENCOUNTER — HOSPITAL ENCOUNTER (OUTPATIENT)
Dept: RADIOLOGY | Facility: HOSPITAL | Age: 25
Discharge: HOME | End: 2025-01-23
Payer: COMMERCIAL

## 2025-01-23 ENCOUNTER — PHARMACY VISIT (OUTPATIENT)
Dept: PHARMACY | Facility: CLINIC | Age: 25
End: 2025-01-23
Payer: MEDICAID

## 2025-01-23 DIAGNOSIS — B00.9 HSV INFECTION: ICD-10-CM

## 2025-01-23 DIAGNOSIS — Z34.00 SUPERVISION OF NORMAL FIRST PREGNANCY, ANTEPARTUM (HHS-HCC): ICD-10-CM

## 2025-01-23 DIAGNOSIS — Z34.92 ENCNTR FOR SUPRVSN OF NORMAL PREG, UNSP, SECOND TRIMESTER: ICD-10-CM

## 2025-01-23 PROCEDURE — RXMED WILLOW AMBULATORY MEDICATION CHARGE

## 2025-01-23 PROCEDURE — 76816 OB US FOLLOW-UP PER FETUS: CPT

## 2025-01-23 RX ORDER — VALACYCLOVIR HYDROCHLORIDE 500 MG/1
TABLET, FILM COATED ORAL
Qty: 90 TABLET | Refills: 2 | Status: SHIPPED | OUTPATIENT
Start: 2025-01-23 | End: 2026-01-22

## 2025-01-27 ENCOUNTER — APPOINTMENT (OUTPATIENT)
Dept: OBSTETRICS AND GYNECOLOGY | Facility: CLINIC | Age: 25
End: 2025-01-27
Payer: COMMERCIAL

## 2025-01-27 VITALS — SYSTOLIC BLOOD PRESSURE: 118 MMHG | WEIGHT: 170 LBS | DIASTOLIC BLOOD PRESSURE: 70 MMHG | BODY MASS INDEX: 29.18 KG/M2

## 2025-01-27 DIAGNOSIS — Z3A.22 22 WEEKS GESTATION OF PREGNANCY (HHS-HCC): Primary | ICD-10-CM

## 2025-01-27 DIAGNOSIS — B00.9 HSV (HERPES SIMPLEX VIRUS) INFECTION: ICD-10-CM

## 2025-01-27 PROCEDURE — 99213 OFFICE O/P EST LOW 20 MIN: CPT | Performed by: OBSTETRICS & GYNECOLOGY

## 2025-01-27 NOTE — PROGRESS NOTES
Pt currently 22w2d  Pt has no complaints except b/l lower extremity edema.    Normal fetal movement. Denies ctx, LOF, VB.     Problem List Items Addressed This Visit          Active Problems    HSV (herpes simplex virus) infection    22 weeks gestation of pregnancy ([facility]) - Primary   LE edema - bilateral with no erythema or pain, improved when feet elevated  -cont to monitor, currently no concern for DVT     Follow up 4 weeks

## 2025-01-28 ENCOUNTER — PHARMACY VISIT (OUTPATIENT)
Dept: PHARMACY | Facility: CLINIC | Age: 25
End: 2025-01-28

## 2025-02-03 DIAGNOSIS — Z76.89 REFERRAL OF PATIENT: ICD-10-CM

## 2025-02-24 PROCEDURE — RXMED WILLOW AMBULATORY MEDICATION CHARGE

## 2025-02-26 ENCOUNTER — APPOINTMENT (OUTPATIENT)
Dept: OBSTETRICS AND GYNECOLOGY | Facility: CLINIC | Age: 25
End: 2025-02-26
Payer: COMMERCIAL

## 2025-02-26 ENCOUNTER — PHARMACY VISIT (OUTPATIENT)
Dept: PHARMACY | Facility: CLINIC | Age: 25
End: 2025-02-26
Payer: MEDICAID

## 2025-02-26 VITALS — BODY MASS INDEX: 30.19 KG/M2 | DIASTOLIC BLOOD PRESSURE: 64 MMHG | SYSTOLIC BLOOD PRESSURE: 104 MMHG | WEIGHT: 175.9 LBS

## 2025-02-26 DIAGNOSIS — Z3A.26 26 WEEKS GESTATION OF PREGNANCY (HHS-HCC): Primary | ICD-10-CM

## 2025-02-26 DIAGNOSIS — Z34.90 ENCOUNTER FOR PRENATAL CARE: ICD-10-CM

## 2025-02-26 PROCEDURE — 99213 OFFICE O/P EST LOW 20 MIN: CPT | Performed by: OBSTETRICS & GYNECOLOGY

## 2025-02-26 ASSESSMENT — EDINBURGH POSTNATAL DEPRESSION SCALE (EPDS)
I HAVE BEEN ABLE TO LAUGH AND SEE THE FUNNY SIDE OF THINGS: AS MUCH AS I ALWAYS COULD
I HAVE FELT SAD OR MISERABLE: NOT VERY OFTEN
I HAVE BLAMED MYSELF UNNECESSARILY WHEN THINGS WENT WRONG: YES, SOME OF THE TIME
I HAVE BEEN SO UNHAPPY THAT I HAVE HAD DIFFICULTY SLEEPING: YES, SOMETIMES
I HAVE FELT SCARED OR PANICKY FOR NO GOOD REASON: NO, NOT MUCH
I HAVE BEEN ANXIOUS OR WORRIED FOR NO GOOD REASON: HARDLY EVER
I HAVE LOOKED FORWARD WITH ENJOYMENT TO THINGS: AS MUCH AS I EVER DID
THINGS HAVE BEEN GETTING ON TOP OF ME: YES, SOMETIMES I HAVEN'T BEEN COPING AS WELL AS USUAL
I HAVE BEEN SO UNHAPPY THAT I HAVE BEEN CRYING: YES, QUITE OFTEN
THE THOUGHT OF HARMING MYSELF HAS OCCURRED TO ME: NEVER
TOTAL SCORE: 11

## 2025-03-18 ENCOUNTER — APPOINTMENT (OUTPATIENT)
Dept: OBSTETRICS AND GYNECOLOGY | Facility: CLINIC | Age: 25
End: 2025-03-18
Payer: COMMERCIAL

## 2025-03-18 VITALS — SYSTOLIC BLOOD PRESSURE: 120 MMHG | DIASTOLIC BLOOD PRESSURE: 60 MMHG | WEIGHT: 183 LBS | BODY MASS INDEX: 31.41 KG/M2

## 2025-03-18 DIAGNOSIS — Z34.90 ENCOUNTER FOR PRENATAL CARE: Primary | ICD-10-CM

## 2025-03-18 DIAGNOSIS — F41.9 ANXIETY: ICD-10-CM

## 2025-03-18 DIAGNOSIS — Z3A.28 28 WEEKS GESTATION OF PREGNANCY (HHS-HCC): ICD-10-CM

## 2025-03-18 PROBLEM — F41.8: Status: ACTIVE | Noted: 2025-03-18

## 2025-03-18 PROBLEM — F41.8: Status: RESOLVED | Noted: 2025-03-18 | Resolved: 2025-03-18

## 2025-03-18 PROCEDURE — 90715 TDAP VACCINE 7 YRS/> IM: CPT | Performed by: OBSTETRICS & GYNECOLOGY

## 2025-03-18 PROCEDURE — 99213 OFFICE O/P EST LOW 20 MIN: CPT | Performed by: OBSTETRICS & GYNECOLOGY

## 2025-03-18 PROCEDURE — 90471 IMMUNIZATION ADMIN: CPT | Performed by: OBSTETRICS & GYNECOLOGY

## 2025-03-18 ASSESSMENT — EDINBURGH POSTNATAL DEPRESSION SCALE (EPDS)
I HAVE FELT SCARED OR PANICKY FOR NO GOOD REASON: YES, SOMETIMES
I HAVE BEEN SO UNHAPPY THAT I HAVE HAD DIFFICULTY SLEEPING: NOT AT ALL
I HAVE BEEN ABLE TO LAUGH AND SEE THE FUNNY SIDE OF THINGS: AS MUCH AS I ALWAYS COULD
I HAVE FELT SAD OR MISERABLE: NOT VERY OFTEN
I HAVE LOOKED FORWARD WITH ENJOYMENT TO THINGS: AS MUCH AS I EVER DID
THINGS HAVE BEEN GETTING ON TOP OF ME: NO, MOST OF THE TIME I HAVE COPED QUITE WELL
I HAVE BLAMED MYSELF UNNECESSARILY WHEN THINGS WENT WRONG: YES, SOME OF THE TIME
TOTAL SCORE: 9
I HAVE BEEN SO UNHAPPY THAT I HAVE BEEN CRYING: ONLY OCCASIONALLY
I HAVE BEEN ANXIOUS OR WORRIED FOR NO GOOD REASON: HARDLY EVER
THE THOUGHT OF HARMING MYSELF HAS OCCURRED TO ME: HARDLY EVER

## 2025-03-18 NOTE — PROGRESS NOTES
"Pt currently 29w3d  Pt has no complaints.    Normal fetal movement. Denies ctx, LOF, VB.     Problem List Items Addressed This Visit          Active Problems    28 weeks gestation of pregnancy ([facility])    Encounter for prenatal care - Primary    Anxiety     Pt reports history of anxiety but feels it has been less with this pregnancy. Denies h/o medication for anxiety or treatment.  Pt admits to some \"good\" days and some \"bad\" days in terms of mood but overall feeling well. Declines offer for support in form of medication or counseling at this time.     Pt will do 1 hr glucose test and other labs today.   Pt currently works at [employer] and would like letter with basic work restrictions for third triemster pregnancy. Will send through [employer].   Follow up 2 weeks.   TDAP offered and accepted today.     "

## 2025-03-22 LAB
ERYTHROCYTE [DISTWIDTH] IN BLOOD BY AUTOMATED COUNT: 12.4 % (ref 11–15)
GLUCOSE 1H P 50 G GLC PO SERPL-MCNC: 68 MG/DL
HCT VFR BLD AUTO: 40 % (ref 35–45)
HGB BLD-MCNC: 13.5 G/DL (ref 11.7–15.5)
MCH RBC QN AUTO: 31.3 PG (ref 27–33)
MCHC RBC AUTO-ENTMCNC: 33.8 G/DL (ref 32–36)
MCV RBC AUTO: 92.6 FL (ref 80–100)
PLATELET # BLD AUTO: 178 THOUSAND/UL (ref 140–400)
PMV BLD REES-ECKER: 10.4 FL (ref 7.5–12.5)
RBC # BLD AUTO: 4.32 MILLION/UL (ref 3.8–5.1)
T PALLIDUM AB SER QL IA: NEGATIVE
WBC # BLD AUTO: 8.2 THOUSAND/UL (ref 3.8–10.8)

## 2025-04-03 ENCOUNTER — APPOINTMENT (OUTPATIENT)
Facility: CLINIC | Age: 25
End: 2025-04-03
Payer: COMMERCIAL

## 2025-04-03 VITALS — DIASTOLIC BLOOD PRESSURE: 68 MMHG | SYSTOLIC BLOOD PRESSURE: 120 MMHG | BODY MASS INDEX: 31.51 KG/M2 | WEIGHT: 183.6 LBS

## 2025-04-03 DIAGNOSIS — Z3A.32 32 WEEKS GESTATION OF PREGNANCY (HHS-HCC): Primary | ICD-10-CM

## 2025-04-03 PROCEDURE — 99213 OFFICE O/P EST LOW 20 MIN: CPT | Performed by: OBSTETRICS & GYNECOLOGY

## 2025-04-03 NOTE — PROGRESS NOTES
Ob Visit  25     SUBJECTIVE      HPI: Abelardo Knapp is a 24 y.o.  at 31w5d here for RPNV.  She has no contractions, no bleeding, or no LOF. Reports normal fetal movement.        OBJECTIVE  Visit Vitals  /68   Wt 83.3 kg (183 lb 9.6 oz)   LMP 2024 (Exact Date)   BMI 31.51 kg/m²   OB Status Pregnant   Smoking Status Never   BSA 1.94 m²            ASSESSMENT & PLAN    Abelardo Knapp is a 24 y.o.  at 31w5d here for the following concerns we addressed today:    Problem List Items Addressed This Visit    None        RTC in 2 weeks      Jose David Vaca MD

## 2025-04-21 PROCEDURE — RXMED WILLOW AMBULATORY MEDICATION CHARGE

## 2025-04-22 ENCOUNTER — APPOINTMENT (OUTPATIENT)
Facility: CLINIC | Age: 25
End: 2025-04-22
Payer: COMMERCIAL

## 2025-04-22 ENCOUNTER — PHARMACY VISIT (OUTPATIENT)
Dept: PHARMACY | Facility: CLINIC | Age: 25
End: 2025-04-22
Payer: MEDICAID

## 2025-04-22 VITALS — WEIGHT: 192 LBS | BODY MASS INDEX: 32.96 KG/M2 | DIASTOLIC BLOOD PRESSURE: 80 MMHG | SYSTOLIC BLOOD PRESSURE: 128 MMHG

## 2025-04-22 DIAGNOSIS — B00.9 HSV (HERPES SIMPLEX VIRUS) INFECTION: Primary | ICD-10-CM

## 2025-04-22 DIAGNOSIS — Z3A.34 34 WEEKS GESTATION OF PREGNANCY (HHS-HCC): ICD-10-CM

## 2025-04-22 PROCEDURE — 99213 OFFICE O/P EST LOW 20 MIN: CPT | Performed by: OBSTETRICS & GYNECOLOGY

## 2025-05-07 ENCOUNTER — APPOINTMENT (OUTPATIENT)
Facility: CLINIC | Age: 25
End: 2025-05-07
Payer: COMMERCIAL

## 2025-05-07 VITALS — BODY MASS INDEX: 34.16 KG/M2 | WEIGHT: 199 LBS | DIASTOLIC BLOOD PRESSURE: 76 MMHG | SYSTOLIC BLOOD PRESSURE: 114 MMHG

## 2025-05-07 DIAGNOSIS — M79.642 PAIN IN BOTH HANDS: ICD-10-CM

## 2025-05-07 DIAGNOSIS — Z34.03 PRENATAL CARE, FIRST PREGNANCY IN THIRD TRIMESTER: ICD-10-CM

## 2025-05-07 DIAGNOSIS — Z3A.36 36 WEEKS GESTATION OF PREGNANCY (HHS-HCC): Primary | ICD-10-CM

## 2025-05-07 DIAGNOSIS — G56.03 BILATERAL CARPAL TUNNEL SYNDROME: ICD-10-CM

## 2025-05-07 DIAGNOSIS — M79.641 PAIN IN BOTH HANDS: ICD-10-CM

## 2025-05-07 PROCEDURE — 99213 OFFICE O/P EST LOW 20 MIN: CPT | Performed by: OBSTETRICS & GYNECOLOGY

## 2025-05-07 NOTE — PROGRESS NOTES
Pt currently 36w4d  Pt complains of numbness/tinging in right hand and stiffness in left thumb, especially in the morning.   Normal fetal movement. Denies ctx, LOF, VB.     Problem List Items Addressed This Visit          Active Problems    36 weeks gestation of pregnancy ([facility]) - Primary    Relevant Orders    Group B Streptococcus (GBS) Prenatal Screen, Culture    Encounter for prenatal care    Relevant Orders    Group B Streptococcus (GBS) Prenatal Screen, Culture     Other Visit Diagnoses         Pain in both hands        Relevant Orders    Adult Referral to Orthopedics and Sports Medicine      Bilateral carpal tunnel syndrome        Relevant Orders    Adult Referral to Orthopedics and Sports Medicine

## 2025-05-10 LAB — GP B STREP SPEC QL CULT: NORMAL

## 2025-05-13 ENCOUNTER — HOSPITAL ENCOUNTER (OUTPATIENT)
Facility: HOSPITAL | Age: 25
Discharge: HOME | End: 2025-05-13
Attending: OBSTETRICS & GYNECOLOGY | Admitting: OBSTETRICS & GYNECOLOGY
Payer: COMMERCIAL

## 2025-05-13 ENCOUNTER — HOSPITAL ENCOUNTER (OUTPATIENT)
Dept: OBSTETRICS AND GYNECOLOGY | Facility: HOSPITAL | Age: 25
Discharge: HOME | End: 2025-05-13
Payer: COMMERCIAL

## 2025-05-13 ENCOUNTER — APPOINTMENT (OUTPATIENT)
Facility: CLINIC | Age: 25
End: 2025-05-13
Payer: COMMERCIAL

## 2025-05-13 VITALS
WEIGHT: 200.62 LBS | OXYGEN SATURATION: 100 % | SYSTOLIC BLOOD PRESSURE: 130 MMHG | HEART RATE: 75 BPM | HEIGHT: 65 IN | DIASTOLIC BLOOD PRESSURE: 80 MMHG | RESPIRATION RATE: 18 BRPM | BODY MASS INDEX: 33.43 KG/M2 | TEMPERATURE: 97.5 F

## 2025-05-13 LAB
ATRIAL RATE: 83 BPM
BILIRUBIN, POC: NEGATIVE
BLOOD URINE, POC: NEGATIVE
CLARITY, POC: CLEAR
COLOR, POC: YELLOW
ERYTHROCYTE [DISTWIDTH] IN BLOOD BY AUTOMATED COUNT: 13.6 % (ref 11.5–14.5)
GLUCOSE URINE, POC: NEGATIVE
HCT VFR BLD AUTO: 45.3 % (ref 36–46)
HGB BLD-MCNC: 14.6 G/DL (ref 12–16)
KETONES, POC: NEGATIVE
LEUKOCYTE EST, POC: NEGATIVE
MCH RBC QN AUTO: 30.7 PG (ref 26–34)
MCHC RBC AUTO-ENTMCNC: 32.2 G/DL (ref 32–36)
MCV RBC AUTO: 95 FL (ref 80–100)
NITRITE, POC: NEGATIVE
NRBC BLD-RTO: 0 /100 WBCS (ref 0–0)
P AXIS: 36 DEGREES
P OFFSET: 194 MS
P ONSET: 159 MS
PH, POC: 7
PLATELET # BLD AUTO: 188 X10*3/UL (ref 150–450)
POC APPEARANCE OF BODY FLUID: CLEAR
PR INTERVAL: 128 MS
Q ONSET: 223 MS
QRS COUNT: 14 BEATS
QRS DURATION: 68 MS
QT INTERVAL: 340 MS
QTC CALCULATION(BAZETT): 399 MS
QTC FREDERICIA: 379 MS
R AXIS: 76 DEGREES
RBC # BLD AUTO: 4.75 X10*6/UL (ref 4–5.2)
SPECIFIC GRAVITY, POC: 1.01
T AXIS: 37 DEGREES
T OFFSET: 393 MS
URINE PROTEIN, POC: NEGATIVE
UROBILINOGEN, POC: 0.2
VENTRICULAR RATE: 83 BPM
WBC # BLD AUTO: 5.9 X10*3/UL (ref 4.4–11.3)

## 2025-05-13 PROCEDURE — 81002 URINALYSIS NONAUTO W/O SCOPE: CPT

## 2025-05-13 PROCEDURE — 93005 ELECTROCARDIOGRAM TRACING: CPT | Mod: 59

## 2025-05-13 PROCEDURE — 59025 FETAL NON-STRESS TEST: CPT

## 2025-05-13 PROCEDURE — 99281 EMR DPT VST MAYX REQ PHY/QHP: CPT | Mod: 25

## 2025-05-13 PROCEDURE — 36415 COLL VENOUS BLD VENIPUNCTURE: CPT

## 2025-05-13 PROCEDURE — 85027 COMPLETE CBC AUTOMATED: CPT

## 2025-05-13 PROCEDURE — 99214 OFFICE O/P EST MOD 30 MIN: CPT

## 2025-05-13 RX ORDER — ONDANSETRON HYDROCHLORIDE 2 MG/ML
4 INJECTION, SOLUTION INTRAVENOUS EVERY 6 HOURS PRN
Status: DISCONTINUED | OUTPATIENT
Start: 2025-05-13 | End: 2025-05-13 | Stop reason: HOSPADM

## 2025-05-13 RX ORDER — ONDANSETRON 4 MG/1
4 TABLET, FILM COATED ORAL EVERY 6 HOURS PRN
Status: DISCONTINUED | OUTPATIENT
Start: 2025-05-13 | End: 2025-05-13 | Stop reason: HOSPADM

## 2025-05-13 SDOH — ECONOMIC STABILITY: HOUSING INSECURITY: DO YOU FEEL UNSAFE GOING BACK TO THE PLACE WHERE YOU ARE LIVING?: NO

## 2025-05-13 SDOH — SOCIAL STABILITY: SOCIAL INSECURITY: ARE YOU OR HAVE YOU BEEN THREATENED OR ABUSED PHYSICALLY, EMOTIONALLY, OR SEXUALLY BY ANYONE?: NO

## 2025-05-13 SDOH — SOCIAL STABILITY: SOCIAL INSECURITY: ARE THERE ANY APPARENT SIGNS OF INJURIES/BEHAVIORS THAT COULD BE RELATED TO ABUSE/NEGLECT?: NO

## 2025-05-13 SDOH — SOCIAL STABILITY: SOCIAL INSECURITY: DO YOU FEEL ANYONE HAS EXPLOITED OR TAKEN ADVANTAGE OF YOU FINANCIALLY OR OF YOUR PERSONAL PROPERTY?: NO

## 2025-05-13 SDOH — HEALTH STABILITY: MENTAL HEALTH: NON-SPECIFIC ACTIVE SUICIDAL THOUGHTS (PAST 1 MONTH): NO

## 2025-05-13 SDOH — SOCIAL STABILITY: SOCIAL INSECURITY: ABUSE SCREEN: ADULT

## 2025-05-13 SDOH — HEALTH STABILITY: MENTAL HEALTH: SUICIDAL BEHAVIOR (LIFETIME): NO

## 2025-05-13 SDOH — SOCIAL STABILITY: SOCIAL INSECURITY: DOES ANYONE TRY TO KEEP YOU FROM HAVING/CONTACTING OTHER FRIENDS OR DOING THINGS OUTSIDE YOUR HOME?: NO

## 2025-05-13 SDOH — SOCIAL STABILITY: SOCIAL INSECURITY: VERBAL ABUSE: DENIES

## 2025-05-13 SDOH — SOCIAL STABILITY: SOCIAL INSECURITY: PHYSICAL ABUSE: DENIES

## 2025-05-13 SDOH — HEALTH STABILITY: MENTAL HEALTH: WISH TO BE DEAD (PAST 1 MONTH): NO

## 2025-05-13 SDOH — HEALTH STABILITY: MENTAL HEALTH: HAVE YOU USED ANY PRESCRIPTION DRUGS OTHER THAN PRESCRIBED IN THE PAST 12 MONTHS?: NO

## 2025-05-13 SDOH — HEALTH STABILITY: MENTAL HEALTH: WERE YOU ABLE TO COMPLETE ALL THE BEHAVIORAL HEALTH SCREENINGS?: YES

## 2025-05-13 SDOH — SOCIAL STABILITY: SOCIAL INSECURITY: HAS ANYONE EVER THREATENED TO HURT YOUR FAMILY OR YOUR PETS?: NO

## 2025-05-13 SDOH — SOCIAL STABILITY: SOCIAL INSECURITY: HAVE YOU HAD ANY THOUGHTS OF HARMING ANYONE ELSE?: NO

## 2025-05-13 SDOH — HEALTH STABILITY: MENTAL HEALTH: HAVE YOU USED ANY SUBSTANCES (CANABIS, COCAINE, HEROIN, HALLUCINOGENS, INHALANTS, ETC.) IN THE PAST 12 MONTHS?: NO

## 2025-05-13 SDOH — SOCIAL STABILITY: SOCIAL INSECURITY: HAVE YOU HAD THOUGHTS OF HARMING ANYONE ELSE?: NO

## 2025-05-13 ASSESSMENT — PATIENT HEALTH QUESTIONNAIRE - PHQ9
SUM OF ALL RESPONSES TO PHQ9 QUESTIONS 1 & 2: 0
1. LITTLE INTEREST OR PLEASURE IN DOING THINGS: NOT AT ALL
2. FEELING DOWN, DEPRESSED OR HOPELESS: NOT AT ALL

## 2025-05-13 ASSESSMENT — LIFESTYLE VARIABLES
AUDIT-C TOTAL SCORE: 0
SKIP TO QUESTIONS 9-10: 1
HOW OFTEN DO YOU HAVE A DRINK CONTAINING ALCOHOL: NEVER
AUDIT-C TOTAL SCORE: 0
HOW MANY STANDARD DRINKS CONTAINING ALCOHOL DO YOU HAVE ON A TYPICAL DAY: PATIENT DOES NOT DRINK
HOW OFTEN DO YOU HAVE 6 OR MORE DRINKS ON ONE OCCASION: NEVER

## 2025-05-13 ASSESSMENT — COLUMBIA-SUICIDE SEVERITY RATING SCALE - C-SSRS
6. HAVE YOU EVER DONE ANYTHING, STARTED TO DO ANYTHING, OR PREPARED TO DO ANYTHING TO END YOUR LIFE?: NO
2. HAVE YOU ACTUALLY HAD ANY THOUGHTS OF KILLING YOURSELF?: NO
1. IN THE PAST MONTH, HAVE YOU WISHED YOU WERE DEAD OR WISHED YOU COULD GO TO SLEEP AND NOT WAKE UP?: NO

## 2025-05-13 ASSESSMENT — PAIN SCALES - GENERAL: PAINLEVEL_OUTOF10: 0 - NO PAIN

## 2025-05-13 NOTE — H&P
Triage H&P    Abelardo Knapp is a 24 y.o. year old  at 37w3d who presents to triage for dizziness, increased swelling, and vaginal pressure.    Assessment/Plan:    BLE Edema  - Equal bilaterally  - No calf pain, tenderness, redness or warmth  - HR 80s, spO2 %  - Low suspicion for DVT at this time as swelling equal bilaterally and persistent since start of 3rd trimester  - Precautions to return discussed    Dizziness  - S.010  - HR 80s, BP normotensive on multiple checks  - Hgb 13.5 (3/18)  - Will repeat CBC today, will call for abnl results  - Discussed changing positions slowly, breaks while standing, and use of compression stockings    Vaginal Pressure  - Cervix: /-3  - TOCO: irritability and occasional ctx noted  - S/sx of labor reviewed  - Recommended tylenol, warm showers, hot packs for discomfort     IUP at 37w3d   - NST reactive  - Good fetal movement  - GBS  negative   - Continue routine prenatal care  - Next appt   - Precautions to return discussed    Maternal Well-being  - Vital signs stable and WNL  - All questions and concerns addressed     Plan and tracing reviewed with Dr. Souza.  Patient safe and stable for d/c home.    Nadine Flynn APRN-CNP      Medical Problems       Problem List       HSV (herpes simplex virus) infection    36 weeks gestation of pregnancy ([facility])    Overview Addendum 2025 11:05 AM by Heather LEA MD   Desired provider in labor: [] CNM  [] Physician   [] Either Acceptable  [] Blood Products: [] Yes, accepts [] No, needs counseling  [x] Initial BMI: 23.50   [x] Prenatal Labs: normal  [x] Cervical Cancer Screening up to date; yes pap  NILM  [x] Rh status: positive  [] Screen for IPV and Substance Use Risk:  [x] Genetic Screening (cfDNA):  low risk  [] First Trimester Anatomy Screen (11-13.6 wks):  [] Baby ASA (initiated):  [x] Pregnancy dated by: LMP agrees with first trimester US    [x] Anatomy US: (19-20 wks)  [] Federal Sterilization  consent signed (if indicated):  [] 1hr GCT at 24-28wks:  [] Rhogam (if indicated):   [] Fetal Surveillance (if indicated):  [] Tdap (27-32 wks, may be given up to 36 wks if initial window missed):   [] RSV (32-36 wks) (Sept. to end of ):     [] Feeding Intentions:  [] Postpartum Birth control method:   [] GBS at 36 - 37 wks:  [] 39 weeks discussion of IOL vs. Expectant management:  [] Mode of delivery ( anticipated ):           Constipation during pregnancy in second trimester ([facility])    Encounter for prenatal care    Anxiety           Subjective   Abelardo Knapp is a 24 y.o. year old  at 37w3d who presents to triage for dizziness, increased swelling, and vaginal pressure.    States she has had increased swelling since the start of the 3rd trimester. States it has gotten slightly worse.  She states some days the swelling is worse than others.  She denies calf pain, redness, or warmth.  Does not feel the swelling is worse on one side versus the other.  She denies CP or SOB.    She states the last two days she has had a few episodes of dizziness.  She feels the episodes are worse when she stands for a prolonged period of time.  She gets hot and flushed and feels dizzy.  Resolves upon sitting.  Does not feel dizzy at rest.  Feels like she can drink more water than what she is currently drinking.  Denies palpitations, falls, or LOC.    Reporting vaginal/pelvic pressure. Denies changes to vaginal discharge, irritation, itchiness, or odor. Denies VB, LOF, ctx, and reports good fetal movement.     OB History          1    Para   0    Term   0       0    AB   0    Living   0         SAB   0    IAB   0    Ectopic   0    Multiple   0    Live Births   0                  Surgical History[1]     Social History     Socioeconomic History    Marital status: Single     Spouse name: Not on file    Number of children: Not on file    Years of education: Not on file    Highest education level: Not on file    Occupational History    Not on file   Tobacco Use    Smoking status: Never    Smokeless tobacco: Never   Vaping Use    Vaping status: Never Used   Substance and Sexual Activity    Alcohol use: Yes     Comment: occasionally    Drug use: Not Currently    Sexual activity: Yes     Partners: Male     Birth control/protection: None   Other Topics Concern    Not on file   Social History Narrative    Not on file     Social Drivers of Health     Financial Resource Strain: Medium Risk (2025)    Overall Financial Resource Strain (CARDIA)     Difficulty of Paying Living Expenses: Somewhat hard   Food Insecurity: Not on file   Transportation Needs: Not on file   Physical Activity: Not on file   Stress: Not on file   Social Connections: Not on file   Intimate Partner Violence: Not on file        RX Allergies[2]     Prescriptions Prior to Admission[3]     Objective     Visit Vitals  /75   Pulse 86   Temp 36.2 °C (97.2 °F) (Temporal)   Resp 18        Physical Exam  Physical Exam  Exam conducted with a chaperone present.   Constitutional:       Appearance: Normal appearance.   HENT:      Head: Normocephalic.   Cardiovascular:      Rate and Rhythm: Normal rate and regular rhythm.      Pulses: Normal pulses.           Dorsalis pedis pulses are 2+ on the right side and 2+ on the left side.      Heart sounds: Normal heart sounds.   Pulmonary:      Effort: Pulmonary effort is normal.      Breath sounds: Normal breath sounds.   Abdominal:      Comments: Gravid   Genitourinary:     Comments: Cervix: 2/50/-3  Musculoskeletal:         General: Normal range of motion.      Right lower le+ Edema present.      Left lower le+ Edema present.   Skin:     General: Skin is warm.   Neurological:      Mental Status: She is alert and oriented to person, place, and time.   Psychiatric:         Mood and Affect: Mood normal.         Behavior: Behavior normal.          NST  Non-Stress Test   Baseline Fetal Heart Rate for Non-Stress  Test: 125 BPM  Variability in Waveform for Non-Stress Test: Moderate  Accelerations in Non-Stress Test: Yes, greater than/equal to 15 bpm, lasting at least 15 seconds  Decelerations in Non-Stress Test: None  Contractions in Non-Stress Test: Irregular  NST Contraction Frequency: irritability, occasional ctx  Interpretation of Non-Stress Test   Interpretation of Non-Stress Test: Reactive      Labs  Labs in chart were reviewed.   CBC pending on discharge         [1]   Past Surgical History:  Procedure Laterality Date    OTHER SURGICAL HISTORY  08/08/2019    No history of surgery   [2] No Known Allergies  [3]   Medications Prior to Admission   Medication Sig Dispense Refill Last Dose/Taking    aspirin 81 mg chewable tablet Chew 2 tablets (162 mg) once daily. 180 tablet 3 5/13/2025    prenatal vitamin, iron-folic, 27 mg iron-800 mcg folic acid tablet Take 1 tablet by mouth once daily. 90 tablet 2 5/13/2025    valACYclovir (Valtrex) 500 mg tablet TAKE 1 TABLET TWICE DAILY FOR 3 DAYS DURING OUTBREAKS FOLLOWED BY 1 PILL DAILY 90 tablet 2 5/13/2025

## 2025-05-16 ENCOUNTER — ROUTINE PRENATAL (OUTPATIENT)
Facility: CLINIC | Age: 25
End: 2025-05-16
Payer: COMMERCIAL

## 2025-05-16 ENCOUNTER — OFFICE VISIT (OUTPATIENT)
Dept: ORTHOPEDIC SURGERY | Facility: CLINIC | Age: 25
End: 2025-05-16
Payer: COMMERCIAL

## 2025-05-16 ENCOUNTER — APPOINTMENT (OUTPATIENT)
Facility: CLINIC | Age: 25
End: 2025-05-16
Payer: COMMERCIAL

## 2025-05-16 VITALS — SYSTOLIC BLOOD PRESSURE: 119 MMHG | DIASTOLIC BLOOD PRESSURE: 78 MMHG | BODY MASS INDEX: 33.61 KG/M2 | WEIGHT: 202 LBS

## 2025-05-16 DIAGNOSIS — M79.641 PAIN IN BOTH HANDS: ICD-10-CM

## 2025-05-16 DIAGNOSIS — M65.312 TRIGGER THUMB, LEFT THUMB: ICD-10-CM

## 2025-05-16 DIAGNOSIS — M79.642 BILATERAL HAND PAIN: ICD-10-CM

## 2025-05-16 DIAGNOSIS — M79.642 PAIN IN BOTH HANDS: ICD-10-CM

## 2025-05-16 DIAGNOSIS — M79.641 BILATERAL HAND PAIN: ICD-10-CM

## 2025-05-16 DIAGNOSIS — G56.03 BILATERAL CARPAL TUNNEL SYNDROME: Primary | ICD-10-CM

## 2025-05-16 DIAGNOSIS — B00.9 HSV (HERPES SIMPLEX VIRUS) INFECTION: ICD-10-CM

## 2025-05-16 DIAGNOSIS — Z3A.37 37 WEEKS GESTATION OF PREGNANCY (HHS-HCC): Primary | ICD-10-CM

## 2025-05-16 PROCEDURE — 99213 OFFICE O/P EST LOW 20 MIN: CPT | Performed by: OBSTETRICS & GYNECOLOGY

## 2025-05-16 RX ORDER — BETAMETHASONE SODIUM PHOSPHATE AND BETAMETHASONE ACETATE 3; 3 MG/ML; MG/ML
6 INJECTION, SUSPENSION INTRA-ARTICULAR; INTRALESIONAL; INTRAMUSCULAR; SOFT TISSUE
Status: COMPLETED | OUTPATIENT
Start: 2025-05-16 | End: 2025-05-16

## 2025-05-16 RX ORDER — LIDOCAINE HYDROCHLORIDE 10 MG/ML
0.5 INJECTION, SOLUTION INFILTRATION; PERINEURAL
Status: COMPLETED | OUTPATIENT
Start: 2025-05-16 | End: 2025-05-16

## 2025-05-16 RX ADMIN — BETAMETHASONE SODIUM PHOSPHATE AND BETAMETHASONE ACETATE 6 MG: 3; 3 INJECTION, SUSPENSION INTRA-ARTICULAR; INTRALESIONAL; INTRAMUSCULAR; SOFT TISSUE at 12:55

## 2025-05-16 RX ADMIN — LIDOCAINE HYDROCHLORIDE 0.5 ML: 10 INJECTION, SOLUTION INFILTRATION; PERINEURAL at 12:55

## 2025-05-16 ASSESSMENT — PAIN SCALES - GENERAL: PAINLEVEL_OUTOF10: 5 - MODERATE PAIN

## 2025-05-16 ASSESSMENT — PAIN - FUNCTIONAL ASSESSMENT: PAIN_FUNCTIONAL_ASSESSMENT: 0-10

## 2025-05-16 NOTE — PROGRESS NOTES
History of Present Illness:  The patient presents today for evaluation of side: bilateral hand pain.  The patient endorses numbness and tingling in bilateral hands.  This is worse with a thumb index and long fingers but does note numbness and tingling in all fingers.  This is worse on the right he also notes tenderness at the left thumb A1 pulley.  She notes this is more probably some than the numbness and tingling in her left hand.  There is no current neck pain or cervical spine issues.  The patient has tried to the following interventions thus far: Rest, ice, elevation.  The patient notes the pain is worse with activity such as driving or talking on the phone and at night.  She notes waking up every night with numbness and tingling and shaking her hands.  She is 38 weeks pregnant.  This is her first child.  The patient denies any recent trauma.  The pain is sharp, electrical in nature.    She denies any pregestational diabetes.  She notes she passed her glucose tolerance this tests.    Medical History[1]    Medication Documentation Review Audit       Reviewed by Melissa Dukes RN (Registered Nurse) on 05/13/25 at 1132      Medication Order Taking? Sig Documenting Provider Last Dose Status   aspirin 81 mg chewable tablet [id number] Yes Chew 2 tablets (162 mg) once daily. Heather LEA MD 5/13/2025 Active   prenatal vitamin, iron-folic, 27 mg iron-800 mcg folic acid tablet [id number] Yes Take 1 tablet by mouth once daily. Heather LEA MD 5/13/2025 Active   valACYclovir (Valtrex) 500 mg tablet [id number] Yes TAKE 1 TABLET TWICE DAILY FOR 3 DAYS DURING OUTBREAKS FOLLOWED BY 1 PILL DAILY Heather LEA MD 5/13/2025 Active                    RX Allergies[2]    Social History     Socioeconomic History    Marital status: Single     Spouse name: Not on file    Number of children: Not on file    Years of education: Not on file    Highest education level: Not on file   Occupational History    Not on  file   Tobacco Use    Smoking status: Never    Smokeless tobacco: Never   Vaping Use    Vaping status: Never Used   Substance and Sexual Activity    Alcohol use: Yes     Comment: occasionally    Drug use: Not Currently    Sexual activity: Yes     Partners: Male     Birth control/protection: None   Other Topics Concern    Not on file   Social History Narrative    Not on file     Social Drivers of Health     Financial Resource Strain: Medium Risk (5/13/2025)    Overall Financial Resource Strain (CARDIA)     Difficulty of Paying Living Expenses: Somewhat hard   Food Insecurity: Not on file   Transportation Needs: Not on file   Physical Activity: Not on file   Stress: Not on file   Social Connections: Not on file   Intimate Partner Violence: Not on file       Surgical History[3]       No History of diabetes or hypothyroidism.     Review of Systems   GENERAL: Negative for malaise, significant weight loss, fever  MUSCULOSKELETAL: see HPI  NEURO:  Negative     Physical Examination:  No tenderness to palpation cervical spine  Good ROM of neck  Negative Spurlings test     side: bilateral wrist/hand:  No obvious swelling or masses  Thenar eminence muscle mass: No atrophy  No atrophy noted of hypothenar eminence   Positive Tinel's at carpal tunnel  + Median nerve compression test  + Phalen's  Tenderness to palpation over the A1 pulley of the left thumb.  Palpable nodule present.  No clicking or locking noted.  Extensor tendons are central over the metacarpal heads.  Decreased sensation to light touch and 2 point discrimination in median nerve distribution  Motor exam within normal limits  Radial pulse palpable  Good capillary refill     EMG: None    Additional studies: None     Assessment:  Patient with side: bilateral carpal tunnel syndrome, left trigger thumb     Plan:  We reviewed the disease process with the patient.  We discussed the role for electrodiagnostic testing and treatment decision making, immobilization, and  injections.  We discussed surgical intervention reviewing the risks (neurologic injury, wound issues including infection, pillar pain, and recurrence) and benefits.  The patient elected for: Injection to the right carpal tunnel and corticosteroid injection to the left trigger thumb.    She is provided with bilateral cock-up wrist braces for nighttime splinting.    Patient was prescribed a cock up wrist brace for bilateral carpal tunnel syndrome.The patient is ambulatory with or without aid; but, has weakness, instability and/or deformity of their bilateral wrist which requires stabilization from this orthosis to improve their function.      Verbal and written instructions for the use, wear schedule, cleaning and application of this item were given.  Patient was instructed that should the brace result in increased pain, decreased sensation, increased swelling, or an overall worsening of their medical condition, to please contact our office immediately.     Orthotic management and training was provided for skin care, modifications due to healing tissues, edema changes, interruption in skin integrity, and safety precautions with the orthosis.          Hand / UE Inj/Asp: L thumb A1 for trigger finger on 5/16/2025 12:55 PM  Indications: therapeutic, pain and tendon swelling  Details: 25 G needle, volar approach  Medications: 6 mg betamethasone acet,sod phos 6 mg/mL; 0.5 mL lidocaine 10 mg/mL (1 %)  Outcome: tolerated well, no immediate complications  Procedure, treatment alternatives, risks and benefits explained, specific risks discussed. Consent was given by the patient. Immediately prior to procedure a time out was called to verify the correct patient, procedure, equipment, support staff and site/side marked as required. Patient was prepped and draped in the usual sterile fashion.       Hand / UE Inj/Asp: R carpal tunnel for carpal tunnel syndrome on 5/16/2025 12:55 PM  Indications: pain and therapeutic  Details: 25 G  needle, volar approach  Medications: 6 mg betamethasone acet,sod phos 6 mg/mL; 0.5 mL lidocaine 10 mg/mL (1 %)  Outcome: tolerated well, no immediate complications  Procedure, treatment alternatives, risks and benefits explained, specific risks discussed. Consent was given by the patient. Immediately prior to procedure a time out was called to verify the correct patient, procedure, equipment, support staff and site/side marked as required. Patient was prepped and draped in the usual sterile fashion.               [1]   Past Medical History:  Diagnosis Date    Encounter for gynecological examination (general) (routine) without abnormal findings 11/11/2022    Encounter for well woman exam    Encounter for immunization 06/17/2015    Immunization due    Encounter for immunization 09/08/2016    Immunization due    Encounter for immunization 10/31/2016    Immunization due    Herpes     Other specified health status 12/18/2014    No pertinent past surgical history   [2] No Known Allergies  [3]   Past Surgical History:  Procedure Laterality Date    OTHER SURGICAL HISTORY  08/08/2019    No history of surgery

## 2025-05-16 NOTE — PROGRESS NOTES
Pt currently 37w6d  Pt has no complaints.    Normal fetal movement. Denies ctx, LOF, VB.     Problem List Items Addressed This Visit          Active Problems    HSV (herpes simplex virus) infection    37 weeks gestation of pregnancy ([facility]) - Primary     Discussed IOL vs awaiting spontaneous labor. Pt would prefer to wait for spontaneous labor for now.   Labor precautions reviewed.   Follow up 1 week (scheduled).

## 2025-05-20 ENCOUNTER — APPOINTMENT (OUTPATIENT)
Facility: CLINIC | Age: 25
End: 2025-05-20
Payer: COMMERCIAL

## 2025-05-20 VITALS — DIASTOLIC BLOOD PRESSURE: 82 MMHG | SYSTOLIC BLOOD PRESSURE: 127 MMHG | BODY MASS INDEX: 33.45 KG/M2 | WEIGHT: 201 LBS

## 2025-05-20 DIAGNOSIS — Z3A.37 37 WEEKS GESTATION OF PREGNANCY (HHS-HCC): Primary | ICD-10-CM

## 2025-05-20 DIAGNOSIS — O36.5930 ENCOUNTER FOR MATERNAL CARE FOR SUSPECTED POOR FETAL GROWTH IN SINGLETON PREGNANCY IN THIRD TRIMESTER: ICD-10-CM

## 2025-05-20 DIAGNOSIS — B00.9 HSV (HERPES SIMPLEX VIRUS) INFECTION: ICD-10-CM

## 2025-05-20 PROCEDURE — 99213 OFFICE O/P EST LOW 20 MIN: CPT | Performed by: STUDENT IN AN ORGANIZED HEALTH CARE EDUCATION/TRAINING PROGRAM

## 2025-05-20 NOTE — PROGRESS NOTES
Ob Follow-up          SUBJECTIVE      HPI: Abelardo Knapp is a 24 y.o.  at 38w3d here for RPNV.  She has intermittent contractions, no bleeding, or LOF. Reports normal fetal movement.   OBJECTIVE  Visit Vitals  /82   Wt 91.2 kg (201 lb)   LMP 2024 (Exact Date)   BMI 33.45 kg/m²   OB Status Pregnant   Smoking Status Never   BSA 2.05 m²     Fundal Height 35- US ordered  Cervical exam:     ASSESSMENT & PLAN    Abelardo Knapp is a 24 y.o.  at 38w3d here for the following concerns we addressed today:    Problem List Items Addressed This Visit          Pregnancy    37 weeks gestation of pregnancy ([facility]) - Primary    Overview   Desired provider in labor: [] CNM  [x] Physician   [] Either Acceptable  [] Blood Products: [x] Yes, accepts [] No, needs counseling  [x] Initial BMI: 23.50   [x] Prenatal Labs: normal  [x] Cervical Cancer Screening up to date; yes pap  NILM  [x] Rh status: positive  [] Screen for IPV and Substance Use Risk:  [x] Genetic Screening (cfDNA):  low risk  [] First Trimester Anatomy Screen (11-13.6 wks):  [] Baby ASA (initiated):  [x] Pregnancy dated by: LMP agrees with first trimester US    [x] Anatomy US: (19-20 wks)  [] Federal Sterilization consent signed (if indicated):  [x] 1hr GCT at 24-28wks: normal (68)  [] Rhogam (if indicated): n/a  [] Fetal Surveillance (if indicated):  [x] Tdap (27-32 wks, may be given up to 36 wks if initial window missed): 3/18/25  [] RSV (32-36 wks) (Sept. to end ):     [x] Feeding Intentions: breast milk  [x] Postpartum Birth control method:  interval IUD   [x] GBS at 36 - 37 wks: negative 25  [] 39 weeks discussion of IOL vs. Expectant management: desires expectant management  [] Mode of delivery ( anticipated ):              Other    HSV (herpes simplex virus) infection    Overview   Genital HSV  On Valtrex suppression          Other Visit Diagnoses         Encounter for maternal care for suspected poor fetal  growth in [place] pregnancy in third trimester        Relevant Orders    US MAC OB imaging order              Orders Placed This Encounter   Procedures    US MAC OB imaging order     Standing Status:   Future     Expected Date:   5/20/2025     Expiration Date:   5/20/2026     Reason for exam::   S<D     Radiologist to Determine Optimal Study:   Yes     Release result to [facility]:   Immediate [1]     Is this exam part of a Research Study? If Yes, link this order to the research study:   No        RTC in 1 weeks    Judith Wilkinson MD

## 2025-05-23 ENCOUNTER — ANESTHESIA EVENT (OUTPATIENT)
Dept: OBSTETRICS AND GYNECOLOGY | Facility: HOSPITAL | Age: 25
End: 2025-05-23
Payer: COMMERCIAL

## 2025-05-23 ENCOUNTER — ANESTHESIA (OUTPATIENT)
Dept: OBSTETRICS AND GYNECOLOGY | Facility: HOSPITAL | Age: 25
End: 2025-05-23
Payer: COMMERCIAL

## 2025-05-23 ENCOUNTER — TELEPHONE (OUTPATIENT)
Dept: OBSTETRICS AND GYNECOLOGY | Facility: HOSPITAL | Age: 25
End: 2025-05-23
Payer: COMMERCIAL

## 2025-05-23 ENCOUNTER — HOSPITAL ENCOUNTER (INPATIENT)
Facility: HOSPITAL | Age: 25
LOS: 2 days | Discharge: HOME | End: 2025-05-25
Attending: OBSTETRICS & GYNECOLOGY | Admitting: OBSTETRICS & GYNECOLOGY
Payer: COMMERCIAL

## 2025-05-23 ENCOUNTER — APPOINTMENT (OUTPATIENT)
Dept: OBSTETRICS AND GYNECOLOGY | Facility: HOSPITAL | Age: 25
End: 2025-05-23
Payer: COMMERCIAL

## 2025-05-23 PROBLEM — Z3A.38 38 WEEKS GESTATION OF PREGNANCY (HHS-HCC): Status: ACTIVE | Noted: 2025-05-23

## 2025-05-23 LAB
ABO GROUP (TYPE) IN BLOOD: NORMAL
ALBUMIN SERPL BCP-MCNC: 3.5 G/DL (ref 3.4–5)
ALP SERPL-CCNC: 152 U/L (ref 33–110)
ALT SERPL W P-5'-P-CCNC: 16 U/L (ref 7–45)
ANION GAP SERPL CALC-SCNC: 13 MMOL/L (ref 10–20)
ANTIBODY SCREEN: NORMAL
AST SERPL W P-5'-P-CCNC: 15 U/L (ref 9–39)
ATRIAL RATE: 108 BPM
BILIRUB SERPL-MCNC: 0.4 MG/DL (ref 0–1.2)
BUN SERPL-MCNC: 6 MG/DL (ref 6–23)
CALCIUM SERPL-MCNC: 9.1 MG/DL (ref 8.6–10.6)
CHLORIDE SERPL-SCNC: 104 MMOL/L (ref 98–107)
CLUE CELLS VAG LPF-#/AREA: NORMAL /[LPF]
CO2 SERPL-SCNC: 23 MMOL/L (ref 21–32)
CREAT SERPL-MCNC: 0.68 MG/DL (ref 0.5–1.05)
EGFRCR SERPLBLD CKD-EPI 2021: >90 ML/MIN/1.73M*2
ERYTHROCYTE [DISTWIDTH] IN BLOOD BY AUTOMATED COUNT: 13.4 % (ref 11.5–14.5)
ERYTHROCYTE [DISTWIDTH] IN BLOOD BY AUTOMATED COUNT: 13.5 % (ref 11.5–14.5)
GLUCOSE SERPL-MCNC: 70 MG/DL (ref 74–99)
HCT VFR BLD AUTO: 41.7 % (ref 36–46)
HCT VFR BLD AUTO: 44.1 % (ref 36–46)
HGB BLD-MCNC: 14.4 G/DL (ref 12–16)
HGB BLD-MCNC: 14.7 G/DL (ref 12–16)
MCH RBC QN AUTO: 30.4 PG (ref 26–34)
MCH RBC QN AUTO: 30.6 PG (ref 26–34)
MCHC RBC AUTO-ENTMCNC: 33.3 G/DL (ref 32–36)
MCHC RBC AUTO-ENTMCNC: 34.5 G/DL (ref 32–36)
MCV RBC AUTO: 88 FL (ref 80–100)
MCV RBC AUTO: 92 FL (ref 80–100)
NRBC BLD-RTO: 0 /100 WBCS (ref 0–0)
NRBC BLD-RTO: 0 /100 WBCS (ref 0–0)
NUGENT SCORE: 0 (ref ?–6)
PLATELET # BLD AUTO: 160 X10*3/UL (ref 150–450)
PLATELET # BLD AUTO: 171 X10*3/UL (ref 150–450)
POTASSIUM SERPL-SCNC: 4.1 MMOL/L (ref 3.5–5.3)
PROT SERPL-MCNC: 6.2 G/DL (ref 6.4–8.2)
Q ONSET: 212 MS
QRS COUNT: 20 BEATS
QRS DURATION: 114 MS
QT INTERVAL: 330 MS
QTC CALCULATION(BAZETT): 468 MS
QTC FREDERICIA: 416 MS
R AXIS: -9 DEGREES
RBC # BLD AUTO: 4.73 X10*6/UL (ref 4–5.2)
RBC # BLD AUTO: 4.8 X10*6/UL (ref 4–5.2)
RH FACTOR (ANTIGEN D): NORMAL
SODIUM SERPL-SCNC: 136 MMOL/L (ref 136–145)
T AXIS: 15 DEGREES
T OFFSET: 377 MS
TREPONEMA PALLIDUM IGG+IGM AB [PRESENCE] IN SERUM OR PLASMA BY IMMUNOASSAY: NONREACTIVE
VENTRICULAR RATE: 121 BPM
WBC # BLD AUTO: 7.2 X10*3/UL (ref 4.4–11.3)
WBC # BLD AUTO: 9.3 X10*3/UL (ref 4.4–11.3)
YEAST VAG WET PREP-#/AREA: NORMAL

## 2025-05-23 PROCEDURE — 85027 COMPLETE CBC AUTOMATED: CPT

## 2025-05-23 PROCEDURE — 87205 SMEAR GRAM STAIN: CPT

## 2025-05-23 PROCEDURE — 86780 TREPONEMA PALLIDUM: CPT

## 2025-05-23 PROCEDURE — 7210000002 HC LABOR PER HOUR

## 2025-05-23 PROCEDURE — 7100000016 HC LABOR RECOVERY PER HOUR

## 2025-05-23 PROCEDURE — 80053 COMPREHEN METABOLIC PANEL: CPT

## 2025-05-23 PROCEDURE — 0UQGXZZ REPAIR VAGINA, EXTERNAL APPROACH: ICD-10-PCS | Performed by: STUDENT IN AN ORGANIZED HEALTH CARE EDUCATION/TRAINING PROGRAM

## 2025-05-23 PROCEDURE — 3E0P7VZ INTRODUCTION OF HORMONE INTO FEMALE REPRODUCTIVE, VIA NATURAL OR ARTIFICIAL OPENING: ICD-10-PCS | Performed by: STUDENT IN AN ORGANIZED HEALTH CARE EDUCATION/TRAINING PROGRAM

## 2025-05-23 PROCEDURE — 36415 COLL VENOUS BLD VENIPUNCTURE: CPT

## 2025-05-23 PROCEDURE — 3700000014 EPIDURAL BLOCK: Mod: GC

## 2025-05-23 PROCEDURE — 2500000002 HC RX 250 W HCPCS SELF ADMINISTERED DRUGS (ALT 637 FOR MEDICARE OP, ALT 636 FOR OP/ED): Mod: SE

## 2025-05-23 PROCEDURE — 0U7C7DJ DILATION OF CERVIX WITH INTRALUM DEV, TEMP, VIA OPENING: ICD-10-PCS | Performed by: STUDENT IN AN ORGANIZED HEALTH CARE EDUCATION/TRAINING PROGRAM

## 2025-05-23 PROCEDURE — 10907ZC DRAINAGE OF AMNIOTIC FLUID, THERAPEUTIC FROM PRODUCTS OF CONCEPTION, VIA NATURAL OR ARTIFICIAL OPENING: ICD-10-PCS | Performed by: STUDENT IN AN ORGANIZED HEALTH CARE EDUCATION/TRAINING PROGRAM

## 2025-05-23 PROCEDURE — 99214 OFFICE O/P EST MOD 30 MIN: CPT

## 2025-05-23 PROCEDURE — 2500000004 HC RX 250 GENERAL PHARMACY W/ HCPCS (ALT 636 FOR OP/ED): Mod: SE,JZ

## 2025-05-23 PROCEDURE — 51701 INSERT BLADDER CATHETER: CPT

## 2025-05-23 PROCEDURE — 86901 BLOOD TYPING SEROLOGIC RH(D): CPT

## 2025-05-23 PROCEDURE — 2500000001 HC RX 250 WO HCPCS SELF ADMINISTERED DRUGS (ALT 637 FOR MEDICARE OP): Mod: SE

## 2025-05-23 PROCEDURE — 59409 OBSTETRICAL CARE: CPT

## 2025-05-23 PROCEDURE — 1210000001 HC SEMI-PRIVATE ROOM DAILY

## 2025-05-23 PROCEDURE — 86900 BLOOD TYPING SEROLOGIC ABO: CPT

## 2025-05-23 PROCEDURE — 0UQMXZZ REPAIR VULVA, EXTERNAL APPROACH: ICD-10-PCS | Performed by: STUDENT IN AN ORGANIZED HEALTH CARE EDUCATION/TRAINING PROGRAM

## 2025-05-23 PROCEDURE — 3E033VJ INTRODUCTION OF OTHER HORMONE INTO PERIPHERAL VEIN, PERCUTANEOUS APPROACH: ICD-10-PCS | Performed by: STUDENT IN AN ORGANIZED HEALTH CARE EDUCATION/TRAINING PROGRAM

## 2025-05-23 PROCEDURE — 2500000004 HC RX 250 GENERAL PHARMACY W/ HCPCS (ALT 636 FOR OP/ED): Mod: JZ,SE

## 2025-05-23 PROCEDURE — 59050 FETAL MONITOR W/REPORT: CPT

## 2025-05-23 PROCEDURE — 59409 OBSTETRICAL CARE: CPT | Performed by: STUDENT IN AN ORGANIZED HEALTH CARE EDUCATION/TRAINING PROGRAM

## 2025-05-23 PROCEDURE — 2500000004 HC RX 250 GENERAL PHARMACY W/ HCPCS (ALT 636 FOR OP/ED): Mod: JZ,SE | Performed by: STUDENT IN AN ORGANIZED HEALTH CARE EDUCATION/TRAINING PROGRAM

## 2025-05-23 RX ORDER — OXYTOCIN 10 [USP'U]/ML
10 INJECTION, SOLUTION INTRAMUSCULAR; INTRAVENOUS ONCE AS NEEDED
Status: DISCONTINUED | OUTPATIENT
Start: 2025-05-23 | End: 2025-05-23 | Stop reason: HOSPADM

## 2025-05-23 RX ORDER — LIDOCAINE HYDROCHLORIDE 10 MG/ML
30 INJECTION, SOLUTION INFILTRATION; PERINEURAL ONCE AS NEEDED
Status: DISCONTINUED | OUTPATIENT
Start: 2025-05-23 | End: 2025-05-23 | Stop reason: HOSPADM

## 2025-05-23 RX ORDER — TRANEXAMIC ACID 1 G/10ML
1000 INJECTION, SOLUTION INTRAVENOUS ONCE AS NEEDED
Status: DISCONTINUED | OUTPATIENT
Start: 2025-05-23 | End: 2025-05-23 | Stop reason: HOSPADM

## 2025-05-23 RX ORDER — ONDANSETRON 4 MG/1
4 TABLET, FILM COATED ORAL EVERY 6 HOURS PRN
Status: DISCONTINUED | OUTPATIENT
Start: 2025-05-23 | End: 2025-05-25 | Stop reason: HOSPADM

## 2025-05-23 RX ORDER — LABETALOL HYDROCHLORIDE 5 MG/ML
20 INJECTION, SOLUTION INTRAVENOUS ONCE AS NEEDED
Status: DISCONTINUED | OUTPATIENT
Start: 2025-05-23 | End: 2025-05-23 | Stop reason: HOSPADM

## 2025-05-23 RX ORDER — CALCIUM CARBONATE 200(500)MG
1 TABLET,CHEWABLE ORAL EVERY 6 HOURS PRN
Status: DISCONTINUED | OUTPATIENT
Start: 2025-05-23 | End: 2025-05-23

## 2025-05-23 RX ORDER — ONDANSETRON HYDROCHLORIDE 2 MG/ML
4 INJECTION, SOLUTION INTRAVENOUS EVERY 6 HOURS PRN
Status: DISCONTINUED | OUTPATIENT
Start: 2025-05-23 | End: 2025-05-25 | Stop reason: HOSPADM

## 2025-05-23 RX ORDER — METHYLERGONOVINE MALEATE 0.2 MG/ML
0.2 INJECTION INTRAVENOUS ONCE AS NEEDED
Status: DISCONTINUED | OUTPATIENT
Start: 2025-05-23 | End: 2025-05-23 | Stop reason: HOSPADM

## 2025-05-23 RX ORDER — LIDOCAINE HCL/EPINEPHRINE/PF 2%-1:200K
VIAL (ML) INJECTION AS NEEDED
Status: DISCONTINUED | OUTPATIENT
Start: 2025-05-23 | End: 2025-05-23

## 2025-05-23 RX ORDER — SIMETHICONE 80 MG
80 TABLET,CHEWABLE ORAL 4 TIMES DAILY PRN
Status: DISCONTINUED | OUTPATIENT
Start: 2025-05-23 | End: 2025-05-25 | Stop reason: HOSPADM

## 2025-05-23 RX ORDER — METHYLERGONOVINE MALEATE 0.2 MG/ML
0.2 INJECTION INTRAVENOUS ONCE AS NEEDED
Status: DISCONTINUED | OUTPATIENT
Start: 2025-05-23 | End: 2025-05-25 | Stop reason: HOSPADM

## 2025-05-23 RX ORDER — TRANEXAMIC ACID 1 G/10ML
1000 INJECTION, SOLUTION INTRAVENOUS ONCE AS NEEDED
Status: DISCONTINUED | OUTPATIENT
Start: 2025-05-23 | End: 2025-05-25 | Stop reason: HOSPADM

## 2025-05-23 RX ORDER — HYDRALAZINE HYDROCHLORIDE 20 MG/ML
5 INJECTION INTRAMUSCULAR; INTRAVENOUS ONCE AS NEEDED
Status: DISCONTINUED | OUTPATIENT
Start: 2025-05-23 | End: 2025-05-25 | Stop reason: HOSPADM

## 2025-05-23 RX ORDER — VALACYCLOVIR HYDROCHLORIDE 500 MG/1
500 TABLET, FILM COATED ORAL EVERY 12 HOURS SCHEDULED
Status: DISCONTINUED | OUTPATIENT
Start: 2025-05-23 | End: 2025-05-23

## 2025-05-23 RX ORDER — OXYTOCIN 10 [USP'U]/ML
10 INJECTION, SOLUTION INTRAMUSCULAR; INTRAVENOUS ONCE AS NEEDED
Status: DISCONTINUED | OUTPATIENT
Start: 2025-05-23 | End: 2025-05-25 | Stop reason: HOSPADM

## 2025-05-23 RX ORDER — ONDANSETRON 4 MG/1
4 TABLET, FILM COATED ORAL EVERY 6 HOURS PRN
Status: DISCONTINUED | OUTPATIENT
Start: 2025-05-23 | End: 2025-05-23

## 2025-05-23 RX ORDER — MINERAL OIL
OIL (ML) ORAL
Status: DISPENSED
Start: 2025-05-23 | End: 2025-05-24

## 2025-05-23 RX ORDER — LABETALOL HYDROCHLORIDE 5 MG/ML
20 INJECTION, SOLUTION INTRAVENOUS ONCE AS NEEDED
Status: DISCONTINUED | OUTPATIENT
Start: 2025-05-23 | End: 2025-05-23

## 2025-05-23 RX ORDER — OXYTOCIN/0.9 % SODIUM CHLORIDE 30/500 ML
60 PLASTIC BAG, INJECTION (ML) INTRAVENOUS ONCE AS NEEDED
Status: DISCONTINUED | OUTPATIENT
Start: 2025-05-23 | End: 2025-05-25 | Stop reason: HOSPADM

## 2025-05-23 RX ORDER — FENTANYL/ROPIVACAINE/NS/PF 2MCG/ML-.2
0-25 PLASTIC BAG, INJECTION (ML) INJECTION CONTINUOUS
Refills: 0 | Status: DISCONTINUED | OUTPATIENT
Start: 2025-05-23 | End: 2025-05-23

## 2025-05-23 RX ORDER — ONDANSETRON HYDROCHLORIDE 2 MG/ML
4 INJECTION, SOLUTION INTRAVENOUS EVERY 6 HOURS PRN
Status: DISCONTINUED | OUTPATIENT
Start: 2025-05-23 | End: 2025-05-23

## 2025-05-23 RX ORDER — LOPERAMIDE HYDROCHLORIDE 2 MG/1
4 CAPSULE ORAL EVERY 2 HOUR PRN
Status: DISCONTINUED | OUTPATIENT
Start: 2025-05-23 | End: 2025-05-25 | Stop reason: HOSPADM

## 2025-05-23 RX ORDER — DIPHENHYDRAMINE HYDROCHLORIDE 50 MG/ML
25 INJECTION, SOLUTION INTRAMUSCULAR; INTRAVENOUS EVERY 6 HOURS PRN
Status: DISCONTINUED | OUTPATIENT
Start: 2025-05-23 | End: 2025-05-25 | Stop reason: HOSPADM

## 2025-05-23 RX ORDER — NIFEDIPINE 10 MG/1
10 CAPSULE ORAL ONCE AS NEEDED
Status: DISCONTINUED | OUTPATIENT
Start: 2025-05-23 | End: 2025-05-23 | Stop reason: HOSPADM

## 2025-05-23 RX ORDER — SODIUM CHLORIDE, SODIUM LACTATE, POTASSIUM CHLORIDE, CALCIUM CHLORIDE 600; 310; 30; 20 MG/100ML; MG/100ML; MG/100ML; MG/100ML
75 INJECTION, SOLUTION INTRAVENOUS CONTINUOUS
Status: DISCONTINUED | OUTPATIENT
Start: 2025-05-23 | End: 2025-05-23

## 2025-05-23 RX ORDER — LABETALOL HYDROCHLORIDE 5 MG/ML
20 INJECTION, SOLUTION INTRAVENOUS ONCE AS NEEDED
Status: DISCONTINUED | OUTPATIENT
Start: 2025-05-23 | End: 2025-05-25 | Stop reason: HOSPADM

## 2025-05-23 RX ORDER — LIDOCAINE HYDROCHLORIDE 10 MG/ML
0.5 INJECTION, SOLUTION INFILTRATION; PERINEURAL ONCE AS NEEDED
Status: DISCONTINUED | OUTPATIENT
Start: 2025-05-23 | End: 2025-05-23

## 2025-05-23 RX ORDER — POLYETHYLENE GLYCOL 3350 17 G/17G
17 POWDER, FOR SOLUTION ORAL 2 TIMES DAILY PRN
Status: DISCONTINUED | OUTPATIENT
Start: 2025-05-23 | End: 2025-05-25 | Stop reason: HOSPADM

## 2025-05-23 RX ORDER — ENOXAPARIN SODIUM 100 MG/ML
40 INJECTION SUBCUTANEOUS EVERY 24 HOURS
Status: DISCONTINUED | OUTPATIENT
Start: 2025-05-24 | End: 2025-05-25 | Stop reason: HOSPADM

## 2025-05-23 RX ORDER — CARBOPROST TROMETHAMINE 250 UG/ML
250 INJECTION, SOLUTION INTRAMUSCULAR ONCE AS NEEDED
Status: DISCONTINUED | OUTPATIENT
Start: 2025-05-23 | End: 2025-05-25 | Stop reason: HOSPADM

## 2025-05-23 RX ORDER — HYDRALAZINE HYDROCHLORIDE 20 MG/ML
5 INJECTION INTRAMUSCULAR; INTRAVENOUS ONCE AS NEEDED
Status: DISCONTINUED | OUTPATIENT
Start: 2025-05-23 | End: 2025-05-23

## 2025-05-23 RX ORDER — OXYTOCIN/0.9 % SODIUM CHLORIDE 30/500 ML
60 PLASTIC BAG, INJECTION (ML) INTRAVENOUS ONCE AS NEEDED
Status: DISCONTINUED | OUTPATIENT
Start: 2025-05-23 | End: 2025-05-23 | Stop reason: HOSPADM

## 2025-05-23 RX ORDER — IBUPROFEN 600 MG/1
600 TABLET, FILM COATED ORAL EVERY 6 HOURS
Status: DISCONTINUED | OUTPATIENT
Start: 2025-05-23 | End: 2025-05-25 | Stop reason: HOSPADM

## 2025-05-23 RX ORDER — ADHESIVE BANDAGE
10 BANDAGE TOPICAL
Status: DISCONTINUED | OUTPATIENT
Start: 2025-05-23 | End: 2025-05-25 | Stop reason: HOSPADM

## 2025-05-23 RX ORDER — ACETAMINOPHEN 325 MG/1
975 TABLET ORAL EVERY 6 HOURS
Status: DISCONTINUED | OUTPATIENT
Start: 2025-05-23 | End: 2025-05-25 | Stop reason: HOSPADM

## 2025-05-23 RX ORDER — CARBOPROST TROMETHAMINE 250 UG/ML
250 INJECTION, SOLUTION INTRAMUSCULAR ONCE AS NEEDED
Status: DISCONTINUED | OUTPATIENT
Start: 2025-05-23 | End: 2025-05-23 | Stop reason: HOSPADM

## 2025-05-23 RX ORDER — TERBUTALINE SULFATE 1 MG/ML
0.25 INJECTION SUBCUTANEOUS ONCE AS NEEDED
Status: DISCONTINUED | OUTPATIENT
Start: 2025-05-23 | End: 2025-05-23 | Stop reason: HOSPADM

## 2025-05-23 RX ORDER — HYDRALAZINE HYDROCHLORIDE 20 MG/ML
5 INJECTION INTRAMUSCULAR; INTRAVENOUS ONCE AS NEEDED
Status: DISCONTINUED | OUTPATIENT
Start: 2025-05-23 | End: 2025-05-23 | Stop reason: HOSPADM

## 2025-05-23 RX ORDER — MISOPROSTOL 200 UG/1
800 TABLET ORAL ONCE AS NEEDED
Status: COMPLETED | OUTPATIENT
Start: 2025-05-23 | End: 2025-05-23

## 2025-05-23 RX ORDER — MISOPROSTOL 200 UG/1
800 TABLET ORAL ONCE AS NEEDED
Status: DISCONTINUED | OUTPATIENT
Start: 2025-05-23 | End: 2025-05-25 | Stop reason: HOSPADM

## 2025-05-23 RX ORDER — LOPERAMIDE HYDROCHLORIDE 2 MG/1
4 CAPSULE ORAL EVERY 2 HOUR PRN
Status: DISCONTINUED | OUTPATIENT
Start: 2025-05-23 | End: 2025-05-23 | Stop reason: HOSPADM

## 2025-05-23 RX ORDER — DIPHENHYDRAMINE HCL 25 MG
25 CAPSULE ORAL EVERY 6 HOURS PRN
Status: DISCONTINUED | OUTPATIENT
Start: 2025-05-23 | End: 2025-05-25 | Stop reason: HOSPADM

## 2025-05-23 RX ORDER — OXYTOCIN/0.9 % SODIUM CHLORIDE 30/500 ML
2-30 PLASTIC BAG, INJECTION (ML) INTRAVENOUS CONTINUOUS
Status: DISCONTINUED | OUTPATIENT
Start: 2025-05-23 | End: 2025-05-23

## 2025-05-23 RX ADMIN — SODIUM CHLORIDE, SODIUM LACTATE, POTASSIUM CHLORIDE, AND CALCIUM CHLORIDE 500 ML: .6; .31; .03; .02 INJECTION, SOLUTION INTRAVENOUS at 13:53

## 2025-05-23 RX ADMIN — MISOPROSTOL 800 MCG: 200 TABLET ORAL at 15:48

## 2025-05-23 RX ADMIN — ACETAMINOPHEN 975 MG: 325 TABLET ORAL at 23:10

## 2025-05-23 RX ADMIN — Medication 5 ML/HR: at 10:25

## 2025-05-23 RX ADMIN — MISOPROSTOL 25 MCG: 100 TABLET ORAL at 08:30

## 2025-05-23 RX ADMIN — SODIUM CHLORIDE, SODIUM LACTATE, POTASSIUM CHLORIDE, AND CALCIUM CHLORIDE 500 ML: .6; .31; .03; .02 INJECTION, SOLUTION INTRAVENOUS at 09:51

## 2025-05-23 RX ADMIN — Medication 10 ML/HR: at 10:30

## 2025-05-23 RX ADMIN — VALACYCLOVIR 500 MG: 500 TABLET, FILM COATED ORAL at 09:52

## 2025-05-23 RX ADMIN — ACETAMINOPHEN 975 MG: 325 TABLET ORAL at 16:28

## 2025-05-23 RX ADMIN — IBUPROFEN 600 MG: 600 TABLET ORAL at 23:10

## 2025-05-23 RX ADMIN — LIDOCAINE HYDROCHLORIDE,EPINEPHRINE BITARTRATE 3 ML: 20; .005 INJECTION, SOLUTION EPIDURAL; INFILTRATION; INTRACAUDAL; PERINEURAL at 10:23

## 2025-05-23 RX ADMIN — Medication 2 MILLI-UNITS/MIN: at 12:41

## 2025-05-23 RX ADMIN — IBUPROFEN 600 MG: 600 TABLET ORAL at 16:28

## 2025-05-23 SDOH — HEALTH STABILITY: MENTAL HEALTH: WISH TO BE DEAD (PAST 1 MONTH): NO

## 2025-05-23 SDOH — SOCIAL STABILITY: SOCIAL INSECURITY: HAVE YOU HAD THOUGHTS OF HARMING ANYONE ELSE?: NO

## 2025-05-23 SDOH — ECONOMIC STABILITY: FOOD INSECURITY: WITHIN THE PAST 12 MONTHS, YOU WORRIED THAT YOUR FOOD WOULD RUN OUT BEFORE YOU GOT THE MONEY TO BUY MORE.: NEVER TRUE

## 2025-05-23 SDOH — SOCIAL STABILITY: SOCIAL INSECURITY
WITHIN THE LAST YEAR, HAVE YOU BEEN RAPED OR FORCED TO HAVE ANY KIND OF SEXUAL ACTIVITY BY YOUR PARTNER OR EX-PARTNER?: NO

## 2025-05-23 SDOH — SOCIAL STABILITY: SOCIAL INSECURITY
WITHIN THE LAST YEAR, HAVE YOU BEEN KICKED, HIT, SLAPPED, OR OTHERWISE PHYSICALLY HURT BY YOUR PARTNER OR EX-PARTNER?: NO

## 2025-05-23 SDOH — SOCIAL STABILITY: SOCIAL INSECURITY: ARE THERE ANY APPARENT SIGNS OF INJURIES/BEHAVIORS THAT COULD BE RELATED TO ABUSE/NEGLECT?: NO

## 2025-05-23 SDOH — HEALTH STABILITY: MENTAL HEALTH: NON-SPECIFIC ACTIVE SUICIDAL THOUGHTS (PAST 1 MONTH): NO

## 2025-05-23 SDOH — SOCIAL STABILITY: SOCIAL INSECURITY: WITHIN THE LAST YEAR, HAVE YOU BEEN AFRAID OF YOUR PARTNER OR EX-PARTNER?: NO

## 2025-05-23 SDOH — HEALTH STABILITY: MENTAL HEALTH: SUICIDAL BEHAVIOR (LIFETIME): NO

## 2025-05-23 SDOH — SOCIAL STABILITY: SOCIAL INSECURITY: ARE YOU OR HAVE YOU BEEN THREATENED OR ABUSED PHYSICALLY, EMOTIONALLY, OR SEXUALLY BY ANYONE?: NO

## 2025-05-23 SDOH — SOCIAL STABILITY: SOCIAL INSECURITY: WITHIN THE LAST YEAR, HAVE YOU BEEN HUMILIATED OR EMOTIONALLY ABUSED IN OTHER WAYS BY YOUR PARTNER OR EX-PARTNER?: NO

## 2025-05-23 SDOH — ECONOMIC STABILITY: FOOD INSECURITY: WITHIN THE PAST 12 MONTHS, THE FOOD YOU BOUGHT JUST DIDN'T LAST AND YOU DIDN'T HAVE MONEY TO GET MORE.: NEVER TRUE

## 2025-05-23 SDOH — SOCIAL STABILITY: SOCIAL INSECURITY: DOES ANYONE TRY TO KEEP YOU FROM HAVING/CONTACTING OTHER FRIENDS OR DOING THINGS OUTSIDE YOUR HOME?: NO

## 2025-05-23 SDOH — SOCIAL STABILITY: SOCIAL INSECURITY: HAVE YOU HAD ANY THOUGHTS OF HARMING ANYONE ELSE?: NO

## 2025-05-23 SDOH — HEALTH STABILITY: MENTAL HEALTH: WERE YOU ABLE TO COMPLETE ALL THE BEHAVIORAL HEALTH SCREENINGS?: YES

## 2025-05-23 SDOH — ECONOMIC STABILITY: HOUSING INSECURITY: DO YOU FEEL UNSAFE GOING BACK TO THE PLACE WHERE YOU ARE LIVING?: NO

## 2025-05-23 SDOH — SOCIAL STABILITY: SOCIAL INSECURITY: VERBAL ABUSE: DENIES

## 2025-05-23 SDOH — SOCIAL STABILITY: SOCIAL INSECURITY: PHYSICAL ABUSE: DENIES

## 2025-05-23 SDOH — SOCIAL STABILITY: SOCIAL INSECURITY: DO YOU FEEL ANYONE HAS EXPLOITED OR TAKEN ADVANTAGE OF YOU FINANCIALLY OR OF YOUR PERSONAL PROPERTY?: NO

## 2025-05-23 SDOH — SOCIAL STABILITY: SOCIAL INSECURITY: ABUSE SCREEN: ADULT

## 2025-05-23 SDOH — SOCIAL STABILITY: SOCIAL INSECURITY: HAS ANYONE EVER THREATENED TO HURT YOUR FAMILY OR YOUR PETS?: NO

## 2025-05-23 ASSESSMENT — PAIN DESCRIPTION - LOCATION: LOCATION: ABDOMEN

## 2025-05-23 ASSESSMENT — LIFESTYLE VARIABLES
HOW OFTEN DO YOU HAVE 6 OR MORE DRINKS ON ONE OCCASION: NEVER
HOW MANY STANDARD DRINKS CONTAINING ALCOHOL DO YOU HAVE ON A TYPICAL DAY: PATIENT DOES NOT DRINK
AUDIT-C TOTAL SCORE: 0
SKIP TO QUESTIONS 9-10: 1
AUDIT-C TOTAL SCORE: 0
HOW OFTEN DO YOU HAVE A DRINK CONTAINING ALCOHOL: NEVER

## 2025-05-23 ASSESSMENT — PAIN SCALES - GENERAL
PAINLEVEL_OUTOF10: 0 - NO PAIN
PAINLEVEL_OUTOF10: 8
PAINLEVEL_OUTOF10: 0 - NO PAIN
PAINLEVEL_OUTOF10: 3
PAINLEVEL_OUTOF10: 0 - NO PAIN
PAINLEVEL_OUTOF10: 6
PAINLEVEL_OUTOF10: 0 - NO PAIN
PAINLEVEL_OUTOF10: 0 - NO PAIN
PAINLEVEL_OUTOF10: 3
PAINLEVEL_OUTOF10: 0 - NO PAIN
PAINLEVEL_OUTOF10: 2
PAINLEVEL_OUTOF10: 0 - NO PAIN

## 2025-05-23 ASSESSMENT — PAIN DESCRIPTION - DESCRIPTORS: DESCRIPTORS: CRAMPING;SORE;PRESSURE

## 2025-05-23 ASSESSMENT — PAIN - FUNCTIONAL ASSESSMENT: PAIN_FUNCTIONAL_ASSESSMENT: 0-10

## 2025-05-23 ASSESSMENT — ACTIVITIES OF DAILY LIVING (ADL): LACK_OF_TRANSPORTATION: NO

## 2025-05-23 NOTE — ANESTHESIA PREPROCEDURE EVALUATION
Patient: Abelardo Knapp    Evaluation Method: In-person visit    Procedure Information    Date: 25  Procedure: Labor Consult         Relevant Problems   Anesthesia (within normal limits)      Cardiac (within normal limits)      Pulmonary (within normal limits)      Neuro   (+) Anxiety      GI (within normal limits)      /Renal (within normal limits)      Liver (within normal limits)      Endocrine (within normal limits)      Hematology (within normal limits)      Musculoskeletal (within normal limits)      ID   (+) HSV (herpes simplex virus) infection      GYN   (+) 37 weeks gestation of pregnancy ([facility])   (+) 38 weeks gestation of pregnancy ([facility])       Clinical information reviewed:    Allergies  Meds               NPO Detail:  NPO/Void Status  Date of Last Liquid: 25  Time of Last Liquid: 400  Date of Last Solid: 25  Time of Last Solid: 400         OB/GYN     Physical Exam    Airway  Mallampati: III  TM distance: >3 FB  Neck ROM: full  Mouth openin finger widths     Cardiovascular    Dental - normal exam     Pulmonary    Abdominal            Anesthesia Plan    History of general anesthesia?: no  History of complications of general anesthesia?: no    ASA 2   (GA vs epidural risks and benefits discussed)  Anesthetic plan and risks discussed with patient.  Use of blood products discussed with patient who consented to blood products.    Plan discussed with CAA and attending.

## 2025-05-23 NOTE — L&D DELIVERY NOTE
Vaginal Delivery Note    Patient Name: Abelardo Knapp  : 2000  MRN: [id number]  Age: 24 y.o.    /Para:   Gestational Age: 38w6d    Date of Delivery: 2025    Procedure: Normal Spontaneous Vaginal Delivery    Delivery Provider: Rachelle Poon MD    Resident/Fellow/Other Assistant: Christiano Johnston MD    Description of Procedure:  Delivery of viable infant under epidural anesthesia. Delayed clamping was performed. The infant was placed skin to skin. Cord gases were not sent.  Cord blood was collected. Placenta delivered intact and fundus was firm. Vaginal and L periurethral laceration noted and repaired with 2-0 monocryl.    Findings:   Amniotic fluid Clear, Female infant in Vertex Occiput Anterior presentation, APGARS 8 , 9 .  Birth Weight 2.64 kg.    Complications: None    Quantitative Blood Loss:      Uterotonics/Hemostatic Agent: Routine IV Pitocin 30 units, rectal cytotec    Specimen:   Placenta  Delivered: 2025  3:34 PM  Appearance: Intact  Removal: Spontaneous    Disposition: discarded    Sponge/Instrument/Needle Counts: The sponge, lap and needle counts were correct.    Patient Disposition: Patient recovering on labor and delivery in stable condition.    Elvira Knapp [[id number]]      Labor Events    Rupture date/time: 2025 1250  Rupture type: Artificial  Fluid color: Clear  Fluid odor: None  Labor type: Induced Onset of Labor  Labor allowed to proceed with plans for an attempted vaginal birth?: Yes  Induction: Hutchinson/EASI, Misoprostol, Oxytocin, AROM  First cervical ripening date/time: 2025 0830  Induction date/time: 2025 0555  Induction indications: Fetal Heart Rate or Rhythm Abnormality  Complications: None       Labor Event Times    Labor onset date/time: 2025 0830  Dilation complete date/time: 2025 1518  Start pushing date/time: 2025 15:24       Placenta    Placenta delivery date/time: 2025 15:34  Placenta removal:  Spontaneous  Placenta appearance: Intact  Placenta disposition: discarded       Cord    Vessels: 3 vessels  Complications: None  Delayed cord clamping?: Yes  Cord clamped date/time: 2025 15:30:00  Cord blood disposition: Discarded  Gases sent?: No  Stem cell collection (by provider): No       Lacerations    Periurethral laceration?: Yes  Periurethral laceration location: left  Periurethral laceration repaired?: Yes  Vaginal laceration?: Yes  Vaginal laceration repaired?: Yes       Anesthesia    Method: Epidural       Operative Delivery    Forceps attempted?: No  Vacuum extractor attempted?: No       Shoulder Dystocia    Shoulder dystocia present?: No       [place] Delivery    Time head delivered: 2025 15:29:00  Birth date/time: 2025 15:29:00  Delivery type: Vaginal, Spontaneous  Complications: None       Resuscitation    Method: Tactile stimulation, Suctioning       Apgars    Living status: Living  Apgar Component Scores:  1 min.:  5 min.:  10 min.:  15 min.:  20 min.:    Skin color:  0  1       Heart rate:  2  2       Reflex irritability:  2  2       Muscle tone:  2  2       Respiratory effort:  2  2       Total:  8  9       Apgars assigned by: SANDOR YANES       Delivery Providers    Delivering clinician: Rachelle Poon MD   Provider Role    Amada Marroquin RN Delivery Nurse    Anne Yanes RN Nursery Nurse    Christiano Johnston MD Resident    Dg Couch MD Resident                Christiano Johnston MD  OBGYN, PGY-1

## 2025-05-23 NOTE — H&P
OB Triage H&P > L&D Admission    Assessment/Plan    Abelardo Knapp is a 24 y.o.  at 38w6d by LMP c/w 8 wk US  who presents to triage with contractions and was found to have non-reassuring fetal heart tracing and admitted for induction of labor     Plan      R/o Labor   - endorsing ctx since 11 pm that are q5-10 mins apart  - exam: non-tender to palpation  - toco: irregular contractions  - SSE: brown mucusy discharge, no active bleeding or blood in vault  - SVE: 1.5/40/-3 and on 2 hr recheck was unchanged     Spotting   Loss of Mucus Plug  Pt denies recent intercourse, trauma or fall. D/w patient that discharge could be likely due to cervical change in labor or infection. SSE negative for any active bleeding or blood in vault and amount of spotting has been minimal and overall low suspicion for abruption  - POCT UA n/f 1+ blood, otherwise wnl  - BV, yeast, GC/CT collected, will call if results are positive and send treatment    IUP@36.6 wga   Non-reassuring tracing  - CEFM with decel @0313 and 3 min decel at 0400 with moderate variability and accelerations. Discussed with patient implications of placental insufficiency and recommendation for induction versus prolonged monitoring. Patient amenable for induction of labor.   -Good fetal movement  -GBS neg   -1 hr gct: 68  -Up to date on prenatal care  -Continue routine prenatal care    Pregnancy notables:  HSV - on valtrex, SSE neg for lesions, pt denies recent outbreaks   Anxiety - no meds, mood stable     Dispo  - admit to L&D, T&S, consented and scanned cephalic by BSUS  - interval IUD for BCM  - d/w pt induction options including CRB and cytotec, patient considering her options     Discussed plan and reviewed with: Dr. Yasemin Levine MD  PGY-1, Obstetrics and Gynecology       Pregnancy Problems (from 10/23/24 to present)       Problem Noted Diagnosed Resolved    Encounter for prenatal care 2025 by Heather LEA MD  No    Priority:  Medium        Constipation during pregnancy in second trimester ([facility]) 2024 by Heather LEA MD  No    Priority:  Medium       37 weeks gestation of pregnancy ([facility]) 10/23/2024 by Heather LEA MD  No    Priority:  Medium       Overview Addendum 2025 11:42 AM by Judith Wilkinson MD   Desired provider in labor: [] CNM  [x] Physician   [] Either Acceptable  [] Blood Products: [x] Yes, accepts [] No, needs counseling  [x] Initial BMI: 23.50   [x] Prenatal Labs: normal  [x] Cervical Cancer Screening up to date; yes pap  NILM  [x] Rh status: positive  [] Screen for IPV and Substance Use Risk:  [x] Genetic Screening (cfDNA):  low risk  [] First Trimester Anatomy Screen (11-13.6 wks):  [] Baby ASA (initiated):  [x] Pregnancy dated by: LMP agrees with first trimester US    [x] Anatomy US: (19-20 wks)  [] Federal Sterilization consent signed (if indicated):  [x] 1hr GCT at 24-28wks: normal (68)  [] Rhogam (if indicated): n/a  [] Fetal Surveillance (if indicated):  [x] Tdap (27-32 wks, may be given up to 36 wks if initial window missed): 3/18/25  [] RSV (32-36 wks) (Sept. to end of ):     [x] Feeding Intentions: breast milk  [x] Postpartum Birth control method:  interval IUD   [x] GBS at 36 - 37 wks: negative 25  [] 39 weeks discussion of IOL vs. Expectant management: desires expectant management  [] Mode of delivery ( anticipated ):                   Subjective   Abelardo Knapp is a 24 y.o.  at 38w6d by LMP c/w 8 wk US  who presents to triage with contractions .    Reports around 11 pm she felt cramping/contraction like pain that was constant for 2 hours and then went away and was able to sleep but woke up with the contractions at 1 am and now feel they are q5-10 minutes. Patient reports her cervix was checked a few days ago and was told she was 2 cm. Feeling good fetal movement. Also reports around 1 am went to the bathroom and noticed brown mucusy discharge and a dime sized amount of dark  red discharge. Since then has had minimal dark brown spotting. Denies blood clots or parker bright red bleeding. Denies loss of fluids. Denies recent trauma or fall. Last sexual intercourse a couple days ago. ROS neg for fever or chills, dysuria, constipation. Denies history of asthma, HTN, diabetes. Considering interval IUD uncertain which type.     Pregnancy notables:  HSV - on valtrex, denies recent outbreaks  Anxiety - mood stable    Prenatal Provider Yasemin    OB History    Para Term  AB Living   1 0 0 0 0 0   SAB IAB Ectopic Multiple Live Births   0 0 0 0 0      # Outcome Date GA Lbr Aron/2nd Weight Sex Type Anes PTL Lv   1 Current                Surgical History[1]    Social History     Tobacco Use    Smoking status: Never    Smokeless tobacco: Never   Substance Use Topics    Alcohol use: Yes     Comment: occasionally       Allergies[2]    Prescriptions Prior to Admission[3]  Objective     Last Vitals  Temp Pulse Resp BP MAP O2 Sat   36.1 °C (97 °F) 101   123/68 90 98 %     Blood Pressures         2025  0306             BP: 123/68             Physical Exam  Constitutional: No visible distress, alert and cooperative  Respiratory/Thorax: Normal respiratory effort on RA, CTAB  Cardiovascular: Reg rate  Gastrointestinal: soft, nondistended, nontender, gravid,   Neurological: A&Ox3  Psychological: Appropriate mood and behavior  Speculum Exam: brown mucusy discharge, no active bleeding or blood in vault, no lesions appreciated  Cervix: 1.5 /40 /-3     Chaperone Present: Yes.  Chaperone Name/Title: Judith Mccloud   Examination Chaperoned: Gynecological Exam     Fetal Monitoring  Baseline: 140/mod/+accel/one decel @0312 that resolved with repositioning and a 3 min decel @0400, but overall with moderate variability and accelerations and return to baseline  Cephalic by BSUS   Uterine Activity: Irregular contractions  Interpretation: Reactive    Bedside ultrasound: Yes    Labs in chart were reviewed.           Prenatal labs reviewed, not remarkable.             [1]   Past Surgical History:  Procedure Laterality Date    OTHER SURGICAL HISTORY  08/08/2019    No history of surgery   [2] No Known Allergies  [3]   Medications Prior to Admission   Medication Sig Dispense Refill Last Dose/Taking    aspirin 81 mg chewable tablet Chew 2 tablets (162 mg) once daily. 180 tablet 3 5/23/2025 Morning    prenatal vitamin, iron-folic, 27 mg iron-800 mcg folic acid tablet Take 1 tablet by mouth once daily. 90 tablet 2 5/23/2025 Morning    valACYclovir (Valtrex) 500 mg tablet TAKE 1 TABLET TWICE DAILY FOR 3 DAYS DURING OUTBREAKS FOLLOWED BY 1 PILL DAILY 90 tablet 2 5/22/2025 Morning

## 2025-05-23 NOTE — ANESTHESIA PREPROCEDURE EVALUATION
Patient: Abelardo Knapp    Evaluation Method: In-person visit    Procedure Information    Date: 25  Procedure: Labor Consult         Relevant Problems   Anesthesia (within normal limits)      Cardiac (within normal limits)      Pulmonary (within normal limits)      Neuro   (+) Anxiety      GI (within normal limits)      /Renal (within normal limits)      Liver (within normal limits)      Endocrine (within normal limits)      Hematology (within normal limits)      Musculoskeletal (within normal limits)      ID   (+) HSV (herpes simplex virus) infection      GYN   (+) 37 weeks gestation of pregnancy ([facility])   (+) 38 weeks gestation of pregnancy ([facility])       Clinical information reviewed:   Tobacco  Allergies  Meds   Med Hx  Surg Hx   Fam Hx          NPO Detail:  NPO/Void Status  Date of Last Liquid: 25  Time of Last Liquid: 400  Date of Last Solid: 25  Time of Last Solid: 400         OB/GYN     Physical Exam    Airway  Mallampati: III  TM distance: >3 FB  Neck ROM: full  Mouth openin finger widths     Cardiovascular    Dental - normal exam     Pulmonary    Abdominal            Anesthesia Plan    History of general anesthesia?: no  History of complications of general anesthesia?: no    ASA 2   (GA vs epidural risks and benefits discussed)  Anesthetic plan and risks discussed with patient.  Use of blood products discussed with patient who consented to blood products.    Plan discussed with CAA and attending.

## 2025-05-23 NOTE — TELEPHONE ENCOUNTER
Pt reports ctx q8 minutes and some light spotting.  No LOF.  Reports baby is not currently moving, but unsure if just in a sleep cycle.  Recommend evaluation in triage for a labor check.    Radha Hernandez MD

## 2025-05-23 NOTE — SIGNIFICANT EVENT
"Labor Progress Note    Subjective: To bedside for AROM    Objective:  Vitals: /79   Pulse 102   Temp 36.8 °C (98.2 °F) (Temporal)   Resp 16   Ht 1.651 m (5' 5\")   Wt 93.2 kg (205 lb 7.5 oz)   LMP 08/24/2024 (Exact Date)   SpO2 100%   BMI 34.19 kg/m²     SVE: 5/60/-3   CEFM: 140bpm/ mod janet/- accels/ + decel starting @ 1246  [place]: ctx q2-5min    Assessment/Plan:    IOL  -s/p ripening w/ crb & cyto, pit start @ 1240  -noted audible decel while at bedside repositioning patient for recheck, maternal repositioning was performed with appropriate recovery. Moderate variability maintained. Monitored tracing for additional 2-3 mins. AROM was performed following  -CEFM, currently Cat II but remains reassuring at this time. Will cont to monitor   -GBS neg    D/w Dr. Khoa Johnston MD  OBGYN, PGY-1   "

## 2025-05-23 NOTE — SIGNIFICANT EVENT
"Labor Progress Note    Subjective: To bedside for cervical reassessment. CRB recently out     Objective:  Vitals: /85   Pulse 85   Temp 36.8 °C (98.2 °F) (Temporal)   Resp 14   Ht 1.651 m (5' 5\")   Wt 93.2 kg (205 lb 7.5 oz)   LMP 08/24/2024 (Exact Date)   SpO2 98%   BMI 34.19 kg/m²     SVE: 4/50/-3   CEFM: Baseline- 150bpm, mod variability, + accels, - decels  [place]: ctx q2-4min     Assessment/Plan:    IOL   -balloon recently out and s/p cyto x 1, SVE as above, for AROM but patient desires epidural beforehand   -CEFM currently Cat I, for pit @ 1230 if contractions space  -Anesthesia contacted for epidural placement  -GBS neg    D/w Dr. Khoa Johnston MD  OBGYN, PGY-1       "

## 2025-05-23 NOTE — SIGNIFICANT EVENT
"Labor Progress Note    Subjective: To bedside for cervical assessment. Pt feeling more pressure     Objective:  Vitals: /84   Pulse 91   Temp 36.7 °C (98.1 °F) (Temporal)   Resp 14   Ht 1.651 m (5' 5\")   Wt 93.2 kg (205 lb 7.5 oz)   LMP 08/24/2024 (Exact Date)   SpO2 97%   BMI 34.19 kg/m²     SVE: 9.5/100/0  CEFM: Baseline- 135bpm, mod variability, + accels, + occasional variable decels  [place]: ctx q2-4min     Assessment/Plan:    IOL   -s/p ripening w/ crb & cyto, pit start @ 1240, AROM @ 1250, SVE as above  -CEFM currently Cat II by occasional shallow variable decels. Tracing overall remains reassuring with accels and mod variability   -Epidural infusing   -GBS neg     D/w Dr. Ayah Johnston MD  OBGYN, PGY-1   "

## 2025-05-23 NOTE — PROGRESS NOTES
Intrapartum Progress Note    Assessment/Plan   Abelardo Knapp is a 24 y.o.  at 38w6d. CHELA: 2025, by LMP c/w 8wk US, admitted from triage for IOL in s/o NRFHT at term.    IOL  -will initiate IOL with crb & cyto   -CEFM currently Cat I   -epidural as requested   -GBS neg    Fetal Status   EFW 6.5# by Leopold's   -CEFM as above    Pregnancy Notables   -HSV - on valtrex, neg SSE, denies recent outbreaks  -Anxiety no meds    Contraception Plan: interval IUD    Pt d/w Dr. Ayah Johnston MD  OBGYN, PGY-1     Assessment & Plan  38 weeks gestation of pregnancy ([facility])    Pregnancy Problems (from 10/23/24 to present)       Problem Noted Diagnosed Resolved    38 weeks gestation of pregnancy ([facility]) 2025 by Brigid Levine MD  No    Priority:  Medium       Encounter for prenatal care 2025 by Heather LEA MD  No    Priority:  Medium       Constipation during pregnancy in second trimester ([facility]) 2024 by Heather LEA MD  No    Priority:  Medium       37 weeks gestation of pregnancy ([facility]) 10/23/2024 by Heather LEA MD  No    Priority:  Medium       Overview Addendum 2025 11:42 AM by Judith Wilkinson MD   Desired provider in labor: [] CNM  [x] Physician   [] Either Acceptable  [] Blood Products: [x] Yes, accepts [] No, needs counseling  [x] Initial BMI: 23.50   [x] Prenatal Labs: normal  [x] Cervical Cancer Screening up to date; yes pap  NILM  [x] Rh status: positive  [] Screen for IPV and Substance Use Risk:  [x] Genetic Screening (cfDNA):  low risk  [] First Trimester Anatomy Screen (11-13.6 wks):  [] Baby ASA (initiated):  [x] Pregnancy dated by: LMP agrees with first trimester US    [x] Anatomy US: (19-20 wks)  [] Federal Sterilization consent signed (if indicated):  [x] 1hr GCT at 24-28wks: normal (68)  [] Rhogam (if indicated): n/a  [] Fetal Surveillance (if indicated):  [x] Tdap (27-32 wks, may be given up to 36 wks if initial window missed):  3/18/25  [] RSV (32-36 wks) (Sept. to end of Jan):     [x] Feeding Intentions: breast milk  [x] Postpartum Birth control method:  interval IUD   [x] GBS at 36 - 37 wks: negative 5/7/25  [] 39 weeks discussion of IOL vs. Expectant management: desires expectant management  [] Mode of delivery ( anticipated ):                   Subjective   Patient doing well at this time. No concerns.    Objective   Last Vitals:  Temp Pulse Resp BP MAP Pulse Ox   (!) 35.8 °C (96.4 °F) 76 18 138/85 105 98 %     Physical Examination:  General Appearance: no acute distress  Lungs: normal work of breathing  Heart: warm, well perfused  Abdomen: gravid  Neurologic: no gross deficits  Psych exam: normal mood and affect    CEFM: Baseline- 135bpm, mod variability, + accels, - decel  [place]: ctx q7-10min     Lab Review:  Labs in chart were reviewed.

## 2025-05-23 NOTE — SIGNIFICANT EVENT
Labor Progress Note    Subjective: To bedside for crb and cyto placement     Objective:    Vitals  /85   Pulse 85   Temp 36.8 °C (98.2 °F) (Temporal)   Resp 14     CEFM: Baseline- 150bpm, mod variability, + accels, -decel   [place]: ctx q5-7min     Assessment/Plan:    IOL   -crb & cyto #1 placed at this time   -CEFM currently Cat I   -Epidural as requested   -GBS neg    D/w Dr. Khoa Johnston MD  OBGYN, PGY-1

## 2025-05-23 NOTE — ANESTHESIA PROCEDURE NOTES
Epidural Block    Patient location during procedure: OB  Start time: 5/23/2025 10:02 AM  End time: 5/23/2025 10:25 AM  Reason for block: labor analgesia  Staffing  Performed: resident   Authorized by: Dean Welch MD    Performed by: Ever Peterson MD    Preanesthetic Checklist  Completed: patient identified, IV checked, risks and benefits discussed, surgical consent, pre-op evaluation, timeout performed and sterile techniques followed  Block Timeout  RN/Licensed healthcare professional reads aloud to the Anesthesia provider and entire team: Patient identity, procedure with side and site, patient position, and as applicable the availability of implants/special equipment/special requirements.  Patient on coagulant treatment: no  Timeout performed at: 5/23/2025 10:04 AM  Block Placement  Patient position: sitting  Prep: ChloraPrep  Sterility prep: cap, drape, gloves, hand and mask  Sedation level: no sedation  Patient monitoring: heart rate, continuous pulse oximetry and blood pressure  Approach: midline  Local numbing: lidocaine 1% to skin and subcutaneous tissues  Vertebral space: lumbar  Lumbar location: L3-L4  Epidural  Loss of resistance technique: saline and air  Guidance: landmark technique        Needle  Needle type: Tuohy   Needle gauge: 18  Needle length: 8.9cm  Needle insertion depth: 7 cm  Catheter type: multi-orifice  Catheter size: 22 G  Catheter at skin depth: 12 cm  Catheter securement method: clear occlusive dressing and liquid medical adhesive    Test dose: lidocaine 1.5% with epinephrine 1-to-200,000  Test dose: lidocaine 1.5% with epinephrine 1-to-200,000  Test dose result: no positive test dose    PCEA  Medication concentration used: 0.2% Ropivacaine with 2 mcg/mL Fentanyl  Dose (mL): 5  Lockout (minutes): 30  1-Hour Limit (boluses/hr): 2  Basal Rate: 10        Assessment  Sensory level: T10 bilateral  Block outcome: patient comfortable  Number of attempts: 1  Events: no positive test  dose  Procedure assessment: patient tolerated procedure well with no immediate complications

## 2025-05-23 NOTE — CARE PLAN
The patient's goals for the shift include control my pain    The clinical goals for the shift include maintain reassuring FHT; establish adequate uterine activity to effect cervical change; Pt will tolerate L&D processes and procedures    Over the shift, the patient did not make progress toward the following goals. Barriers to progression include pending admission and insertion of CRB.. Recommendations to address these barriers include admission assessment now complete.  Pt states is tolerating current contraction pain well.

## 2025-05-23 NOTE — DISCHARGE INSTRUCTIONS

## 2025-05-24 PROBLEM — R03.0 ELEVATED BLOOD PRESSURE READING WITHOUT DIAGNOSIS OF HYPERTENSION: Status: ACTIVE | Noted: 2025-05-24

## 2025-05-24 PROCEDURE — 2500000004 HC RX 250 GENERAL PHARMACY W/ HCPCS (ALT 636 FOR OP/ED): Mod: JZ,SE

## 2025-05-24 PROCEDURE — 2500000001 HC RX 250 WO HCPCS SELF ADMINISTERED DRUGS (ALT 637 FOR MEDICARE OP): Mod: SE

## 2025-05-24 PROCEDURE — 2500000005 HC RX 250 GENERAL PHARMACY W/O HCPCS: Mod: SE

## 2025-05-24 PROCEDURE — 1210000001 HC SEMI-PRIVATE ROOM DAILY

## 2025-05-24 RX ORDER — METOCLOPRAMIDE 10 MG/1
10 TABLET ORAL ONCE
Status: COMPLETED | OUTPATIENT
Start: 2025-05-24 | End: 2025-05-24

## 2025-05-24 RX ADMIN — IBUPROFEN 600 MG: 600 TABLET ORAL at 04:35

## 2025-05-24 RX ADMIN — ACETAMINOPHEN 975 MG: 325 TABLET ORAL at 04:35

## 2025-05-24 RX ADMIN — ENOXAPARIN SODIUM 40 MG: 100 INJECTION SUBCUTANEOUS at 05:50

## 2025-05-24 RX ADMIN — WITCH HAZEL 1 EACH: 500 SOLUTION RECTAL; TOPICAL at 05:59

## 2025-05-24 RX ADMIN — ACETAMINOPHEN 975 MG: 325 TABLET ORAL at 22:48

## 2025-05-24 RX ADMIN — IBUPROFEN 600 MG: 600 TABLET ORAL at 16:52

## 2025-05-24 RX ADMIN — BENZOCAINE AND LEVOMENTHOL 1 APPLICATION: 200; 5 SPRAY TOPICAL at 05:59

## 2025-05-24 RX ADMIN — ACETAMINOPHEN 975 MG: 325 TABLET ORAL at 10:45

## 2025-05-24 RX ADMIN — IBUPROFEN 600 MG: 600 TABLET ORAL at 22:48

## 2025-05-24 RX ADMIN — ACETAMINOPHEN 975 MG: 325 TABLET ORAL at 16:52

## 2025-05-24 RX ADMIN — METOCLOPRAMIDE 10 MG: 10 TABLET ORAL at 22:48

## 2025-05-24 RX ADMIN — DIPHENHYDRAMINE HYDROCHLORIDE 25 MG: 25 CAPSULE ORAL at 22:48

## 2025-05-24 RX ADMIN — IBUPROFEN 600 MG: 600 TABLET ORAL at 10:45

## 2025-05-24 ASSESSMENT — PAIN DESCRIPTION - DESCRIPTORS
DESCRIPTORS: SORE
DESCRIPTORS: ACHING;SORE
DESCRIPTORS: SORE
DESCRIPTORS: ACHING
DESCRIPTORS: SORE;TENDER
DESCRIPTORS: SORE

## 2025-05-24 ASSESSMENT — PAIN DESCRIPTION - LOCATION
LOCATION: PERINEUM
LOCATION: BACK

## 2025-05-24 ASSESSMENT — PAIN SCALES - GENERAL
PAINLEVEL_OUTOF10: 4
PAINLEVEL_OUTOF10: 2
PAINLEVEL_OUTOF10: 6
PAINLEVEL_OUTOF10: 5 - MODERATE PAIN

## 2025-05-24 ASSESSMENT — PAIN - FUNCTIONAL ASSESSMENT
PAIN_FUNCTIONAL_ASSESSMENT: 0-10
PAIN_FUNCTIONAL_ASSESSMENT: 0-10

## 2025-05-24 NOTE — ANESTHESIA POSTPROCEDURE EVALUATION
Patient: Abelardo Knapp    Procedure Summary       Date: 05/23/25 Room / Location:     Anesthesia Start: 1002 Anesthesia Stop: 1529    Procedure: Labor Analgesia Diagnosis:     Scheduled Providers:  Responsible Provider: Dean Welch MD    Anesthesia Type: epidural ASA Status: 2            Anesthesia Type: epidural    Vitals:    05/24/25 0436   BP: 126/73   Pulse: 90   Resp: 18   Temp: 36.5 °C (97.7 °F)   SpO2: 96%         Anesthesia Post Evaluation    Patient location during evaluation: floor  Patient participation: complete - patient participated  Level of consciousness: awake and alert  Pain management: adequate  Multimodal analgesia pain management approach  Airway patency: patent  Two or more strategies used to mitigate risk of obstructive sleep apnea  Cardiovascular status: stable and acceptable  Respiratory status: acceptable and room air  Hydration status: euvolemic  Postoperative Nausea and Vomiting: none  Comments: Neuraxial site without redness, drainage, swelling.  Neuromuscular block resolved.          No notable events documented.

## 2025-05-24 NOTE — CARE PLAN
The patient's goals for the shift include rest, bonding with     The clinical goals for the shift include adequate pain control, vitals WNL    VSS and assessment WNL. Bleeding and swelling minimal. Pain controlled with medications. Bottle feeding. Bonding well with infant.      Problem: Postpartum  Goal: Experiences normal postpartum course  Outcome: Progressing  Goal: Appropriate maternal -  bonding  Outcome: Progressing

## 2025-05-24 NOTE — LACTATION NOTE
Lactation Consultant Note  Lactation Consultation  Reason for Consult: Initial assessment, Difficult latch, Other (Comment) (per NIht shift bedside RN- baby tongue thrusting - difficult latch)  Consultant Name: Aileen Dent, RN, IBCLC    Maternal Information  Has mother  before?: No  Infant to breast within first 2 hours of birth?: No  Breastfeeding Delayed Due to:  (attempted - baby tongue thrusting)  Exclusive Pump and Bottle Feed: No (Bedside RN started mom pumping d/t formula supplement/ not able to sustain a latch)    Maternal Assessment  Breast Assessment: Medium, Symmetrical, Compressible, Soft  Nipple Assessment: Intact, Erect with stimulation  [place]ola Assessment: Normal    Infant Assessment  Infant Behavior: Sleepy, Feeding cues observed, Rooting response, Suckles on and off, needs stimulation  Infant Assessment: Palate - high/arch/bubble/normal, Tongue humped/bunched/retracted/elevated    Feeding Assessment  Nutrition Source: Breastmilk, Formula (per mother’s request)  Feeding Method: Nursing at the breast, Feeding expressed breastmilk, Syringe feeding  Feeding Position: Cross - cradle, Football/seated, C - hold, Laid back, Mother needs assistance with latch/positioning  Suck/Feeding: Coordinated suck/swallow/breathe, Tactile stimulation needed, Unsustained, Other (Comment) (sleepy)  Latch Assessment: Maximum assistance is needed, Instructed on deep latch, Areolar attachment, Deep latch obtained, Optimal angle of mouth opening, Mouth not open wide enough, Comfortable with no pain, Latch is painful, Chin and lower lip contact breast first, Flanged lips, Other (Comment) (few latches the baby didn't open wide enough and mom stated the latch was uncomfortable readjusted and relatched to comfortable latch)    LATCH TOOL  Latch: Repeated attempts, hold nipple in mouth, stimulate to suck  Audible Swallowing: A few with stimulation  Type of Nipple: Everted (After stimulation)  Comfort  (Breast/Nipple): Soft/non-tender  Hold (Positioning): Full assist, staff holds infant at breast  LATCH Score: 6    Breast Pump  Pump: Hospital grade electric pump, Double breast pumping  Frequency: Other (comment) (mom was set up to pump last night d/t formula supplementation- - if baby doesn't lach or she chooses to give formula at a feeding- she needs to pump)  Duration: Initiate phase  Breast Shield Size and Type: Other (comment) (she measured to be 16 MM diameter bilaterally- gave 18 MM flanges.)    Other OB Lactation Tools  Lactation Tools: Flanges    Patient Follow-up  Inpatient Lactation Follow-up Needed : Yes  Outpatient Lactation Follow-up: Recommended    Other OB Lactation Documentation  Maternal Risk Factors: Other (comment), Extreme tiredness, fatigue or stress (hx of anxiety)  Infant Risk Factors: Early term birth 37-39 weeks, Other (comment) (tongue thrusting)    Recommendations/Summary  Baby around 17 hours at time of visit.   Parents gave formula at the last feed - 10 ML.    Per Bedside RN- night shift RN stated the baby was tongue thrusting when attempting to latch.   In to see mom and bedside RN assisting with latching the baby to the breast at this time in cross cradle. Baby not suckling and mom's hand placement was on the back of the baby's head not allowing the baby to pull away if needed.   Offered to assist with repositioning the baby and mom was receptive.     Reviewed positioning of baby (we tried cross cradle h old, football hold and then laid back on the left breast),  use of pillow support, hand placement on baby and on breast, expression of colostrum to the nipple prior to latching (minimal colostrum noted at this time),  latching technique, and use of breast compression and stimulation to keep the baby feeding at the breast longer.    We attempted a few positions however, the baby would latch and do a few suckles and fall asleep.   I placed baby in skin to skin while I reviewed breast  feeding education topics and measured mom's nipples for correct flange size; she measured to be 16 MM- brought her 18 MM flanges to use when pumping.   Baby started to root again and I was able to obtain a latch in laid back position on the left breast (full assist), mom was sleepy. Noted a few swallows. Initial latch on was a little uncomfortable per mom but, then was okay, but, at the end of the feed (that lasted only about 5 minutes before baby falling asleep at the breast) baby slipped shallow again and a very slight crease noted (but, base of nipple rounded) when baby taken off the breast.   Dad at the bedside. Baby left on mom's chest but, wrapped. Mom resting.   Encouraged Dad to place baby back in the crib if he needs to step away from the bed.     Mom to pump when she awakens.   Encouraged Dad to have mom call back out when she going to pump so I can review the pump with her.   Left P![id number], CDC pump cleaning guide and outpatient lactation resource list.     Mom has a breast pump for at home.

## 2025-05-24 NOTE — PROGRESS NOTES
Postpartum Progress Note    Assessment/Plan   Abelardo Knapp is a 24 y.o., , who delivered at 38w6d gestation    Now PPD#1 s/p Vaginal, Spontaneous on 2025  - Continue routine postpartum care  - Pain well controlled on po medications  - DVT risk score DVT Score (IF A SCORE IS NOT CALCULATING, MUST SELECT A BMI TO COMPLETE): 5 , ppx with SCDs, ambulation, and lovenox  - RH positive, rhogam not indicated  - Hgb:   Results from last 7 days   Lab Units 25  1657 25  0652   HEMOGLOBIN g/dL 14.7 14.4        Mild Range BP  - x1, no diagnosis of HTN  - Reviewed signs elevated of BP  - Normotensive and asymptomatic during postpartum course     Anxiety  - No meds  - Mood stable  - Declines needs for additional resources at this time     Maternal Well-Being  - Vitals stable  - All questions and concerns address    [place] Feeding  - Breastfeeding/pumping encouraged  - Lactation consult prn    Contraception  - Interval IUD  - Education provided    Dispo  - Anticipate d/c on PPD #3 if meeting all postpartum milestones  - Follow-up in 4-6wks with primary OGYN    DARREL Dewey     Assessment & Plan  38 weeks gestation of pregnancy ([facility])    Elevated blood pressure reading without diagnosis of hypertension    Pregnancy Problems (from 10/23/24 to present)       Problem Noted Diagnosed Resolved    Elevated blood pressure reading without diagnosis of hypertension 2025 by DARREL Dewey  No    Priority:  Medium       Overview Signed 2025  7:42 AM by DARREL Dewey   - Mild range blood pressures <4hrs apart         38 weeks gestation of pregnancy ([facility]) 2025 by Brigid Levine MD  No    Priority:  Medium       Encounter for prenatal care 2025 by Heather LEA MD  No    Priority:  Medium       Constipation during pregnancy in second trimester ([facility]) 2024 by Heather LEA MD  No    Priority:  Medium       37 weeks gestation of pregnancy  ([facility]) 10/23/2024 by Heather LEA MD  No    Priority:  Medium       Overview Addendum 5/20/2025 11:42 AM by Judith Wilkinson MD   Desired provider in labor: [] CNM  [x] Physician   [] Either Acceptable  [] Blood Products: [x] Yes, accepts [] No, needs counseling  [x] Initial BMI: 23.50   [x] Prenatal Labs: normal  [x] Cervical Cancer Screening up to date; yes pap 2024 NILM  [x] Rh status: positive  [] Screen for IPV and Substance Use Risk:  [x] Genetic Screening (cfDNA):  low risk  [] First Trimester Anatomy Screen (11-13.6 wks):  [] Baby ASA (initiated):  [x] Pregnancy dated by: LMP agrees with first trimester US    [x] Anatomy US: (19-20 wks)  [] Federal Sterilization consent signed (if indicated):  [x] 1hr GCT at 24-28wks: normal (68)  [] Rhogam (if indicated): n/a  [] Fetal Surveillance (if indicated):  [x] Tdap (27-32 wks, may be given up to 36 wks if initial window missed): 3/18/25  [] RSV (32-36 wks) (Sept. to end of Jan):     [x] Feeding Intentions: breast milk  [x] Postpartum Birth control method:  interval IUD   [x] GBS at 36 - 37 wks: negative 5/7/25  [] 39 weeks discussion of IOL vs. Expectant management: desires expectant management  [] Mode of delivery ( anticipated ):                   Subjective     Abelardo Knapp is PPD#1 s/p vaginal delivery who reports feeling overall well.  No acute events overnight.  Voiding spontaneously, passing flatus.  Pain well controlled on PO meds.  Light lochia. Tolerating diet.  Denies HA, CP, SOB, RUQ pain, vision changes or N/V. Denies dizziness, lightheadedness, LOC, or uncontrolled bleeding.    Objective   Allergies:   Patient has no known allergies.         Last Vitals:  Temp Pulse Resp BP MAP Pulse Ox   36.4 °C (97.5 °F) 95 15 133/79   97 %     Vitals Min/Max Last 24 Hours:  Temp  Min: 36.4 °C (97.5 °F)  Max: 37 °C (98.6 °F)  Pulse  Min: 70  Max: 121  Resp  Min: 14  Max: 18  BP  Min: 117/57  Max: 148/69    Intake/Output:     Intake/Output Summary (Last 24  hours) at 5/24/2025 1229  Last data filed at 5/24/2025 0410  Gross per 24 hour   Intake --   Output 1710 ml   Net -1710 ml       Physical Exam:  General: examination reveals a well developed, well nourished, female, in no acute distress. She is alert and cooperative.  HEENT: external ears normal. Nose normal, no erythema or discharge.  Neck: supple, no significant adenopathy  Lungs: breathing even and unlabored, lungs clear  Cardiac: warm and well perfused, heart rate regular  Fundus: firm and below umbilicus, lochia light  Abdominal: soft, non-tender, non-distended, bowel sounds active  Extremities: no redness or tenderness in the calves or thighs, edema: non-pitting BLE  Neurological: alert, oriented, normal speech, no focal findings or movement disorder noted.  Psychological: awake and alert; oriented to person, place, and time.  Skin: no rashes or lesions    Lab Data:  Labs in chart were reviewed.

## 2025-05-24 NOTE — DISCHARGE SUMMARY
"Discharge Summary    Abelardo Knapp is a 24 y.o. year old female , who delivered at 38w6d gestation via Vaginal, Spontaneous, now PPD#2.    Admission Date: 2025  Discharge Date: 25       Discharge Diagnosis  Vaginal, Spontaneous    Hospital Course  Delivery Date: 2025 3:29 PM  Delivery type: Vaginal, Spontaneous   GA at delivery: 38w6d   Outcome: Living  Intrapartum complications: None  Feeding method: Breastfeeding Status: Yes       Contraception: Interval IUD  We discussed pregnancy spacing of at least one year, abstaining from intercourse for 6wks, and the ability to become pregnant in the absence of regular menses. Pt verbalized understanding.    Pertinent Physical Exam At Time of Discharge    Physical Exam  Vitals reviewed.   Constitutional:       Appearance: Normal appearance.   HENT:      Head: Normocephalic.   Cardiovascular:      Rate and Rhythm: Normal rate and regular rhythm.      Pulses: Normal pulses.      Heart sounds: Normal heart sounds.   Pulmonary:      Effort: Pulmonary effort is normal.      Breath sounds: Normal breath sounds.   Abdominal:      General: Abdomen is flat. Bowel sounds are normal.      Palpations: Abdomen is soft.      Comments: Fundus firm, midline, below umbilicus, light lochia      Skin:     General: Skin is warm.   Neurological:      Mental Status: She is alert.   Psychiatric:         Mood and Affect: Mood normal.         Behavior: Behavior normal.          Pertinent Subjective at Time of Discharge  Abelardo Knapp is a 24 y.o. year old female , who delivered at 38w6d gestation via Vaginal, Spontaneous, now PPD#2, who reports feeling overall well.  No acute events overnight.  Voiding spontaneously, passing flatus.  Pain well controlled on PO meds.  Light lochia. Tolerating diet.     Vitals  /87 (BP Location: Right arm, Patient Position: Sitting)   Pulse 87   Temp 36.5 °C (97.7 °F) (Temporal)   Resp 16   Ht 1.651 m (5' 5\")   Wt 93.2 kg " (205 lb 7.5 oz)   LMP 08/24/2024 (Exact Date)   SpO2 94%   Breastfeeding Yes   BMI 34.19 kg/m²      Discharge Meds     Your medication list        START taking these medications        Instructions Last Dose Given Next Dose Due   acetaminophen 325 mg tablet  Commonly known as: Tylenol      Take 3 tablets (975 mg) by mouth every 6 hours for 10 days.       ibuprofen 600 mg tablet      Take 1 tablet (600 mg) by mouth every 6 hours for 10 days.              CONTINUE taking these medications        Instructions Last Dose Given Next Dose Due   Prenatal Vitamin 27 mg iron- 0.8 mg tablet  Generic drug: prenatal vitamin (iron-folic)      Take 1 tablet by mouth once daily.              STOP taking these medications      aspirin 81 mg chewable tablet        valACYclovir 500 mg tablet  Commonly known as: Valtrex                  Where to Get Your Medications        These medications were sent to [facility]  [address], [address], [place] [address]      Hours: 8AM to 6PM Mon-Fri, 8AM to 4PM Sat, 9AM to 1PM Sun Phone: [phone]   acetaminophen 325 mg tablet  ibuprofen 600 mg tablet          Complications Requiring Follow-Up  Mild Range BP  - x1, no diagnosis of HTN  - Reviewed signs elevated of BP  - Normotensive and asymptomatic during postpartum course      Anxiety  - No meds  - Mood stable  - Declines needs for additional resources at this time     Continues to meet all postpartum milestones.  Safe and stable for d/c home. Return precautions given.  Follow-up in 4-6wk with Zoe Hazel.     Test Results Pending At Discharge  Pending Labs       No current pending labs.            Outpatient Follow-Up    I spent 20 minutes in the professional and overall care of this patient.      Nadine Flynn, APRN-CNP

## 2025-05-24 NOTE — LACTATION NOTE
Lactation Consultant Note  Lactation Consultation  Reason for Consult: Follow-up assessment, Other (Comment), Breast/nipple pain (called by bedside Rn for assistance with painful latch.)  Consultant Name: Aileen Dent, SANDOR, IBCLC    Maternal Information       Maternal Assessment  Breast Assessment: Medium, Symmetrical, Compressible  Nipple Assessment: Intact, Erect with stimulation, Creased after feeding, Rounded after feeding, Sore, Other (Comment) (left nipple slightly creased but, left nipple round after feed)  Alterations in Nipple Condition: Stage I - pain or irritation with no skin break down  Areola Assessment: Normal    Infant Assessment  Infant Behavior: Feeding cues observed, Readiness to feed, Sucking    Feeding Assessment  Nutrition Source: Breastmilk, Formula (per mother’s request)  Feeding Method: Nursing at the breast, Paced bottle  Feeding Position: C - hold, Football/seated, Laid back, Skin to skin, Both sides, Mother needs assistance with latch/positioning  Suck/Feeding: Coordinated suck/swallow/breathe, Tactile stimulation needed, Swallowing intermittently only with encouragement, Other (Comment) (mom c/o discomfort with the latch and wanted to take the baby off the breast- nipple round indicating it was a deep latch - mom wanted to pump and supplement)  Latch Assessment: Maximum assistance is needed, Areolar attachment, Shallow latch, Deep latch obtained, Optimal angle of mouth opening, Pain during feeding, Flanged lips    LATCH TOOL  Latch: Repeated attempts, hold nipple in mouth, stimulate to suck  Audible Swallowing: A few with stimulation  Type of Nipple: Everted (After stimulation)  Comfort (Breast/Nipple): Filling, red/small blisters/bruises, mild/moderate discomfort  Hold (Positioning): Full assist, staff holds infant at breast  LATCH Score: 5    Breast Pump  Pump: Hospital grade electric pump, Double breast pumping, Massage  Frequency: Other (comment) (pump if  supplements)  Duration: Initiate phase  Breast Shield Size and Type: Other (comment) (18)    Other OB Lactation Tools  Lactation Tools: Flanges    Patient Follow-up  Inpatient Lactation Follow-up Needed : Yes  Outpatient Lactation Follow-up: Recommended    Other OB Lactation Documentation  Maternal Risk Factors: Extreme tiredness, fatigue or stress, Other (comment) (hx of anxiety)  Infant Risk Factors: Poor or painful latch / restricted feedings, Early term birth 37-39 weeks, Prelacteal feeds    Recommendations/Summary  Asked by bedside RN for assistance d/t mom having pain with the latch.   Baby was latched in football hold on the left breast- mom's hand placement needed to be down behind shoulder blades and base of neck and baby needed a slightly deeper latch.  Nipple creased at the tip of the nipple - therefore the latch was a slightly shallow latch.   MOM was receptive at trying to latch the baby onto the opposite side- I chose laid back position d.t baby bunching and thrusting the tongue previously.   Full assist and it took a few attempts to get the baby to latch. Deep latch obtained however, mom stated the latch still felt pinchy and tugging. She stated she wanted to stop and just pump and supplement the baby.   When baby came off of the breast; the nipple was completely round indicating a deep latch. I'm unsure why the latch was pinchy. (Mom is also very much exhausted and hasn't slept since delivery.   I supplemented baby with 15 ML of formula at her request.   Encouraged her to eat and then sleep    Oriented mom to pump set up- use- and cleaning of pump parts.     Reviewed the difference between latching and pumping, the benefit of skin to skin, the benefits of breast massage prior to pumping, expectations of volume with pumping, milk storage and cleaning of pump parts.     PI-123 and CDC pump cleaning guide given.     Encouraged mom to breastfeed on demand with a goal of 8-12 feeds in a 24 hour period.    If baby is not showing feeding cues and it has been 3 hours since the last time the baby was fed or the last time the baby attempted to feed, encouraged her to place baby in skin to skin.    If she chooses to not latch the baby to the breast or she chooses to supplement; encouraged her to pump every 3 hours (8-12 times in a 24 hour period) for 15 minutes on both breasts and to give the baby any pumped breast milk prior to any use of supplement.    Mom verbalized understanding.     Mom has a breast pump for at home.   Has outpt. Lactation resource list.

## 2025-05-25 ENCOUNTER — PHARMACY VISIT (OUTPATIENT)
Dept: PHARMACY | Facility: CLINIC | Age: 25
End: 2025-05-25
Payer: MEDICAID

## 2025-05-25 VITALS
HEART RATE: 87 BPM | DIASTOLIC BLOOD PRESSURE: 87 MMHG | SYSTOLIC BLOOD PRESSURE: 126 MMHG | BODY MASS INDEX: 34.23 KG/M2 | HEIGHT: 65 IN | WEIGHT: 205.47 LBS | OXYGEN SATURATION: 94 % | RESPIRATION RATE: 16 BRPM | TEMPERATURE: 97.7 F

## 2025-05-25 PROCEDURE — 99238 HOSP IP/OBS DSCHRG MGMT 30/<: CPT

## 2025-05-25 PROCEDURE — RXMED WILLOW AMBULATORY MEDICATION CHARGE

## 2025-05-25 PROCEDURE — 2500000005 HC RX 250 GENERAL PHARMACY W/O HCPCS: Mod: SE

## 2025-05-25 PROCEDURE — 2500000001 HC RX 250 WO HCPCS SELF ADMINISTERED DRUGS (ALT 637 FOR MEDICARE OP): Mod: SE

## 2025-05-25 RX ORDER — IBUPROFEN 600 MG/1
600 TABLET, FILM COATED ORAL EVERY 6 HOURS
Qty: 40 TABLET | Refills: 0 | Status: SHIPPED | OUTPATIENT
Start: 2025-05-25 | End: 2025-06-04

## 2025-05-25 RX ORDER — ACETAMINOPHEN 325 MG/1
975 TABLET ORAL EVERY 6 HOURS
Qty: 120 TABLET | Refills: 0 | Status: SHIPPED | OUTPATIENT
Start: 2025-05-25 | End: 2025-06-04

## 2025-05-25 RX ORDER — LIDOCAINE 560 MG/1
1 PATCH PERCUTANEOUS; TOPICAL; TRANSDERMAL DAILY
Status: DISCONTINUED | OUTPATIENT
Start: 2025-05-25 | End: 2025-05-25 | Stop reason: HOSPADM

## 2025-05-25 RX ADMIN — ACETAMINOPHEN 975 MG: 325 TABLET ORAL at 05:16

## 2025-05-25 RX ADMIN — IBUPROFEN 600 MG: 600 TABLET ORAL at 05:16

## 2025-05-25 RX ADMIN — IBUPROFEN 600 MG: 600 TABLET ORAL at 11:17

## 2025-05-25 RX ADMIN — LIDOCAINE 4% 1 PATCH: 40 PATCH TOPICAL at 07:49

## 2025-05-25 RX ADMIN — ACETAMINOPHEN 975 MG: 325 TABLET ORAL at 11:17

## 2025-05-25 ASSESSMENT — PAIN DESCRIPTION - DESCRIPTORS
DESCRIPTORS: TENDER;SORE
DESCRIPTORS: SORE

## 2025-05-25 ASSESSMENT — PAIN - FUNCTIONAL ASSESSMENT: PAIN_FUNCTIONAL_ASSESSMENT: 0-10

## 2025-05-25 ASSESSMENT — PAIN SCALES - GENERAL
PAINLEVEL_OUTOF10: 3
PAINLEVEL_OUTOF10: 4
PAINLEVEL_OUTOF10: 0 - NO PAIN

## 2025-05-25 ASSESSMENT — PAIN DESCRIPTION - LOCATION: LOCATION: BACK

## 2025-05-25 NOTE — LACTATION NOTE
Lactation Consultant Note  Lactation Consultation  Reason for Consult: Follow-up assessment, Breast/nipple pain, Difficult latch  Consultant Name: Aileen Dent RN, IBCLC    Maternal Information       Maternal Assessment  Breast Assessment: Medium, Symmetrical, Compressible  Nipple Assessment: Intact, Short, Erect with stimulation, Sore  Areola Assessment: Normal    Infant Assessment  Infant Behavior: Sleepy, Other (Comment) (few suckles with the nipple shield in laid back position- mom stated too painful- even with the nipple shield and a deep latch)    Feeding Assessment  Nutrition Source: Formula (per mother’s request), Breastmilk  Feeding Method: Paced bottle, Nursing at the breast  Feeding Position: C - hold, Football/seated, Laid back, Mother needs assistance with latch/positioning  Suck/Feeding: Unsustained, Other (Comment) (baby got into a good rythmic pattern and then mom too uncomfortable with the baby at the breast)  Latch Assessment: Maximum assistance is needed, Instructed on deep latch, Areolar attachment, Deep latch obtained, Eagerly grasped on to latch, Optimal angle of mouth opening, Pain during feeding, Flanged lips    LATCH TOOL  Latch: Repeated attempts, hold nipple in mouth, stimulate to suck  Audible Swallowing: None  Type of Nipple: Everted (After stimulation)  Comfort (Breast/Nipple): Soft/non-tender  Hold (Positioning): Full assist, staff holds infant at breast  LATCH Score: 5    Breast Pump  Pump: Hospital grade electric pump, Double breast pumping, Massage  Frequency: 8-10 times per day  Duration: Initiate phase  Breast Shield Size and Type: Other (comment) (18 MM)  Units of Volume: Drops    Other OB Lactation Tools  Lactation Tools: Flanges    Patient Follow-up  Outpatient Lactation Follow-up: Recommended    Other OB Lactation Documentation  Maternal Risk Factors: Extreme tiredness, fatigue or stress, Other (comment) (hx of anxiety)  Infant Risk Factors: Early term birth 37-39  weeks, Poor or painful latch / restricted feedings, Prelacteal feeds    Recommendations/Summary  In to assist with a feeding. The baby was last fed formula about 3 hours prior- placed baby in skin to skin and a few feeding cues observed. Arabella SIEGEL RN, IBCLC first assisted with attempting to latch onto the right breast in football hold - baby lathes but, just holds he breast. I discussed possibly trying to use a nipple shield and mom was receptive.   She showed mom how to place nipple shield onto her breasts correctly and re attempted the baby to feed- latched but, just held the baby.   I stepped in (d/t Arabella off shift) and we attempted in a modified football hold (away from the mom's body heat) and baby still just held the breast. Placed baby in skin to skin and baby was rooting.   One last attempt in laid back position with the nipple shield and baby stayed latched and started to suckle at the breast. Got into a good rhythmic pattern but, mom stated the latch was too uncomfortable/ painful.   Detached the baby from the breast, took nipple shield off to inspect the nipple and the nipple was completely round - no angling or creasing noted. Therefore, the baby had a deep latch.   Mom did vocalize that she is sensitive on her breasts.   Discussed her options other than latching at this time (d/t discomfort) and I encouraged her to pump.   Mom stated she does want to provided breast milk to the baby.   Mom tearful- much reassurance given to mom on she can work on bringing in her milk and revisit latching once her full milk supply is in.   She has the outpatient lactation resource list and I encouraged her to follow up with them.   For now mom stated she will continue to pump every 3 hours and give pumped breast milk and use formula supplement   until her full milk supply comes in.   Reviewed with parents stomach capacity/ appropriate supplementation guide on different days of life.     Mom has a breast pump for at home.      Encouraged her to utilize the outpatient lactation resources, if needed.   Contact information given.   [phone] [facility] or [phone] Raul

## 2025-05-25 NOTE — PROGRESS NOTES
Abelardo Knapp is a 24 y.o. female on day 2 of admission presenting with 38 weeks gestation of pregnancy ([facility]).    1:56 pm  This SW attempted to meet with pt but pt was not available.     2:22 pm  [facility] Social Work Note:     Abelardo Knapp is a 24 y.o. female who is admitted to [facility]  for the following:   Problem List Items Addressed This Visit    None  Visit Diagnoses         Postpartum state ([facility])    -  Primary    Relevant Medications    acetaminophen (Tylenol) 325 mg tablet    ibuprofen 600 mg tablet             This SWer completed an assessment with Abelardo Knapp (Mom)    Consult Reason: Mental Health Concerns    Patient Name:  Abelardo Knapp  Medical Record Number:  [id number]  YOB: 2000    Address: [address] [place] [address]  [id number]  [place] [id number]   Lives With: MORTEZA PRO's two other children    Date Seen:  2025    Pregnancy Information:   Baby's Name: Tai       FOB: Riley    Insurance Information: [place] Medicaid. Ms. Knapp is planning to enroll Baby in this plan. Reviewed instructions on how to do that.     Financial/Housing/Utility Concerns: Denies concerns relating to housing, housing payment, utility payment, or other.    Nutrition/Food Concerns: Reports a concern regarding food: interested in SNAP and WIC.     DCFS Involvement: Reports no prior or current involvement with DCFS    Transportation Concerns: Denies a concern relating to transportation to and from medical appointments, work, or otherwise    Mental Health/Self-Esteem: Per Mom, there is not a reported mental health history in the family. Provided a handout from [employer] relating to  mood disorders that describes various postpartum mood disorders, treatment options, and their contact information. Encouraged Mom to please reach out to their healthcare providers if they notice any mood dysregulation. Advised Mom that some Baby Blues is normal, but if the mood  dysregulation lasts longer than 4-6 weeks, to please reach out. Mom expressed verbal understanding.     Would you like a referral placed today for a mental health counselor?  no    Substance Use: Denies using illegal substances or legal (alcohol, tobacco, cannabis) substance use during pregnancy.     Parent Family/Social Supports: family, partner    Medical Compliance:   Pediatrician/PCP:  Pt states she does not have a pediatrician selected but was given resources that she plans to review.   Prenatal Care:    DV/IPV Safety Concerns: no    Other Concerns: Reviewed the ABCs of safe sleep (alone, on back, in a crib, no smoking). Expressed to Mom that should anyone smoke around their baby, to please ask them to wash their hands, change their clothes, and to not smoke in the house or around their baby. Mom expressed verbal understanding.    Mom state that they do have a car seat for when baby is ready for discharge. Mom state that they do have a safe sleep space available at home.    Recommendations:   This SW met with MOB while FOB and two other children present. MOB provided consent for SW to meet with others present. MOB and FOB present as pleasant and appropriate. This SW provided resources for SNAP, WIC, baby supplies, and PPD via email.   Social Work will continue to remain available. Please feel free to reach out regarding any questions or concerns. It was a pleasure getting to know you and your family.    Ms. Knapp AND JUDITH Lujan ARE CLEARED FOR DISCHARGE WHEN MEDICALLY READY    5/25/2025  SHAJI Cabrera, LSW  Licensed Social Worker  [facility]  Office Phone: [phone]  Secure chat preferred   BRYLEE M. JONES, LSW

## 2025-05-25 NOTE — LACTATION NOTE
Lactation Consultant Note  Lactation Consultation  Reason for Consult: Follow-up assessment, Other (Comment) (peds request. rn at bedside.)  Consultant Name: donna scheier    Maternal Information  Has mother  before?: No  Exclusive Pump and Bottle Feed: No    Maternal Assessment  Breast Assessment: Medium  Nipple Assessment: Intact, Erect, Short  Areola Assessment: Normal    Infant Assessment  Infant Behavior: Light sleep, Rooting response  Infant Assessment: Good cupping of tongue, Tongue protrudes over alveolar ridge    Feeding Assessment  Nutrition Source: Breastmilk, Formula (per mother’s request) (mother gave formula before asking for breastfeeding assist)  Feeding Method: Nursing at the breast  Feeding Position: Breast sandwich, Football/seated, Nipple to nose, Chin tucked into breast, Nose lightly touching breast  Suck/Feeding: Unsustained (a couple of sucks and baby sleepy. had formula prior to attempt)  Latch Assessment: Reluctant, Too sleepy, Unable to arouse for feeding, Flanged lips, Maximum assistance is needed    LATCH TOOL  Latch: Repeated attempts, hold nipple in mouth, stimulate to suck  Audible Swallowing: None  Type of Nipple: Everted (After stimulation)  Comfort (Breast/Nipple): Soft/non-tender  Hold (Positioning): Full assist, staff holds infant at breast  LATCH Score: 5    Breast Pump  Pump: Hospital grade electric pump, Hand expression (encouraged to pump and hand express after skin to skin)  Frequency: Other (comment) (instructed to pump if giving formula and baby does not latch)  Duration: 15-20 minutes per session  Breast Shield Size and Type:  (mother had pump in room. reviewed use.)  Units of Volume: Drops (drops of colostrum seen after hand expression. mother instructed to feed to baby w clean hands or rub on nipple and let dry for soreness.)    Other OB Lactation Tools       Patient Follow-up  Inpatient Lactation Follow-up Needed : Yes    Other OB Lactation  Documentation  Infant Risk Factors: Early term birth 37-39 weeks    Recommendations/Summary  Mother stated she fed 15 ml formula about 20 minutes before LC entered room.  Bedside nurse assisting mother on right breast in football hold. LC attempted to latch baby but baby sleepy. Latched for several sucks but fell asleep. Baby put skin to skin w mother. Feeding cues reviewed. Supply and demand and risk of not breastfeeding reviewed.  Pacifier seen in crib. Mother instructed that pacifiers should be used after several weeks when breastfeeding is well established. Pump set up and mother instructed to pump after giving formula followed by hand expression which was taught w return demonstration. Mother encouraged to keep baby skin to skin until ready to pump. Encouraged to call for help for next feed. Mother states she is being discharged but baby will remain as patient so she can get help w breastfeeding.

## 2025-05-25 NOTE — CARE PLAN
Problem: Postpartum  Goal: Experiences normal postpartum course  Outcome: Adequate for Discharge  Goal: Appropriate maternal -  bonding  Outcome: Adequate for Discharge     Problem: Pain  Goal: Turns in bed with improved pain control throughout the shift  Outcome: Adequate for Discharge  Goal: Walks with improved pain control throughout the shift  Outcome: Adequate for Discharge

## 2025-05-25 NOTE — CARE PLAN
The patient's goals for the shift include rest, bonding with     The clinical goals for the shift include adequate pain control, vitals WNL    VSS and assessment WNL except for HA that resolved with Benadryl/Reglan/Tyl. Also reporting heaviness to bilateral lower ribs, nontender to touch, MD made aware. Pt also states breasts are feeling heavier, tender. Supplementing with formula most of night; continued pumping encouraged when infant doesn't latch. Encouraged to ambulate. Bleeding minimal. Pain controlled with medications. Bonding well with infant, resting.      Problem: Postpartum  Goal: Experiences normal postpartum course  Outcome: Progressing  Goal: Appropriate maternal -  bonding  Outcome: Progressing     Problem: Pain  Goal: Turns in bed with improved pain control throughout the shift  Outcome: Progressing  Goal: Walks with improved pain control throughout the shift  Outcome: Progressing

## 2025-05-27 ENCOUNTER — APPOINTMENT (OUTPATIENT)
Facility: CLINIC | Age: 25
End: 2025-05-27
Payer: COMMERCIAL

## 2025-05-27 ENCOUNTER — TELEPHONE (OUTPATIENT)
Dept: OBSTETRICS AND GYNECOLOGY | Facility: CLINIC | Age: 25
End: 2025-05-27

## 2025-05-28 ENCOUNTER — APPOINTMENT (OUTPATIENT)
Facility: CLINIC | Age: 25
End: 2025-05-28
Payer: COMMERCIAL

## 2025-05-28 DIAGNOSIS — M62.838 MUSCLE SPASM: Primary | ICD-10-CM

## 2025-05-28 RX ORDER — CYCLOBENZAPRINE HCL 10 MG
5 TABLET ORAL EVERY 8 HOURS PRN
Qty: 10 TABLET | Refills: 0 | Status: SHIPPED | OUTPATIENT
Start: 2025-05-28

## 2025-05-29 ENCOUNTER — APPOINTMENT (OUTPATIENT)
Dept: RADIOLOGY | Facility: HOSPITAL | Age: 25
End: 2025-05-29
Payer: COMMERCIAL

## 2025-05-30 ENCOUNTER — TELEPHONE (OUTPATIENT)
Dept: OBSTETRICS AND GYNECOLOGY | Facility: HOSPITAL | Age: 25
End: 2025-05-30
Payer: COMMERCIAL

## 2025-05-31 NOTE — TELEPHONE ENCOUNTER
Abelardo Knapp is a 24 y.o.  now PPD6 from uncomplicated . She calls in reporting bilateral lower extremity swelling. Swelling involves feet and ankles. No redness, pain or palpable cords in calves. Reviewed expected course of swelling post partum and conservative management. Reviewed signs and symptoms of DVT and when to seek emergent care. Patient understanding and comfortable with plan. Scheduled for PPV with Dr. Hazel on , encouraged to contact office if she wishes to be seen sooner.    Justin Mejia MD

## 2025-06-18 ENCOUNTER — TELEPHONE (OUTPATIENT)
Dept: OBSTETRICS AND GYNECOLOGY | Facility: HOSPITAL | Age: 25
End: 2025-06-18
Payer: COMMERCIAL

## 2025-06-18 NOTE — TELEPHONE ENCOUNTER
Patient verified name and date of birth at start of call and was made aware that that due to the patient's symptoms that she experienced it is recommended to be evaluate in L&D triage. Patient made aware that dizziness could have been caused by various factors such as hormonal fluctuations or blood pressure. Patient was also made aware that preeclampsia could develop during 6 week postpartum period but further evaluation is recommended just to be on the safe side. Patient verbalized understanding, made aware that Dr. Hazel has been notified. Patient had no further questions, comments or concerns at this time.    SANDOR Johnston

## 2025-06-21 LAB
ATRIAL RATE: 108 BPM
Q ONSET: 212 MS
QRS COUNT: 20 BEATS
QRS DURATION: 114 MS
QT INTERVAL: 330 MS
QTC CALCULATION(BAZETT): 468 MS
QTC FREDERICIA: 416 MS
R AXIS: -9 DEGREES
T AXIS: 15 DEGREES
T OFFSET: 377 MS
VENTRICULAR RATE: 121 BPM

## 2025-07-07 ENCOUNTER — APPOINTMENT (OUTPATIENT)
Facility: CLINIC | Age: 25
End: 2025-07-07
Payer: COMMERCIAL

## 2025-07-07 VITALS
HEIGHT: 64 IN | BODY MASS INDEX: 30.56 KG/M2 | SYSTOLIC BLOOD PRESSURE: 140 MMHG | WEIGHT: 179 LBS | DIASTOLIC BLOOD PRESSURE: 70 MMHG

## 2025-07-07 DIAGNOSIS — Z30.011 ENCOUNTER FOR INITIAL PRESCRIPTION OF CONTRACEPTIVE PILLS: ICD-10-CM

## 2025-07-07 DIAGNOSIS — R30.0 DYSURIA: ICD-10-CM

## 2025-07-07 DIAGNOSIS — M54.50 ACUTE MIDLINE LOW BACK PAIN WITHOUT SCIATICA: Primary | ICD-10-CM

## 2025-07-07 PROCEDURE — 99213 OFFICE O/P EST LOW 20 MIN: CPT | Performed by: OBSTETRICS & GYNECOLOGY

## 2025-07-07 PROCEDURE — RXMED WILLOW AMBULATORY MEDICATION CHARGE

## 2025-07-07 RX ORDER — CHOLECALCIFEROL (VITAMIN D3) 25 MCG
25 TABLET ORAL DAILY
Qty: 90 TABLET | Refills: 3 | Status: SHIPPED | OUTPATIENT
Start: 2025-07-07 | End: 2026-07-07

## 2025-07-07 RX ORDER — BISMUTH SUBSALICYLATE 262 MG
1 TABLET,CHEWABLE ORAL DAILY
COMMUNITY

## 2025-07-07 RX ORDER — IBUPROFEN 600 MG/1
600 TABLET, FILM COATED ORAL EVERY 6 HOURS PRN
Qty: 40 TABLET | Refills: 1 | Status: SHIPPED | OUTPATIENT
Start: 2025-07-07

## 2025-07-07 RX ORDER — DESOGESTREL AND ETHINYL ESTRADIOL 0.15-0.03
1 KIT ORAL DAILY
Qty: 28 TABLET | Refills: 12 | Status: SHIPPED | OUTPATIENT
Start: 2025-07-07 | End: 2026-07-07

## 2025-07-07 NOTE — PROGRESS NOTES
"Post Partum Visit    25 y.o.  presenting for postpartum follow-up     Delivery Date: 25    Type of Delivery: Vaginal, Spontaneous     Concerns: pt has many concerns today  Depression - She reports feeling down and depressed most days since delivery.  She is living with her partner who has two small children so has been taking care of her  and 2 other children.  Increased anxiety for 2 weeks after delivery (with history of anxiety). Feels things are improving but still has decreased appetite, difficulty sleeping and feeling sad some days. Also not enjoying being with friend/family.  Pt reports she is doing all ADL for herself and children and no SI/HI of any kind. Would like to talk to a therapist.   Complains of low back pain that she feels is related to the epidural. Had requested flexeril but never picked up prescription.   Complains of \"throbbing\" sensation with urination, especially in clitoral area.  Feels this may be related to repair after delivery.   Initially had increased numbness of left foot/leg (at time of epidural) but reports this has completely resolved and both sides feel the same/functioning normally.       Pain: controlled  Lacerations: vaginal and periurethral  Sexual Intimacy: Yes ; unprotected sex 25 - no concerns   Contraceptive Method: None  Feeding Method: She is bottle feeding.     Bonding with Baby  Mood:   Postpartum Depression: High Risk (3/18/2025)    Trail  Depression Scale     Last EPDS Total Score: 9     Last EPDS Self Harm Result: Hardly ever       Visit Vitals  /70   Ht 1.626 m (5' 4\")   Wt 81.2 kg (179 lb)   LMP 2024 (Exact Date)   Breastfeeding No   BMI 30.73 kg/m²   OB Status Recent pregnancy   Smoking Status Never   BSA 1.92 m²       Physical Exam  Constitutional:       Appearance: Normal appearance. She is normal weight.   Abdominal:      General: Abdomen is flat.      Palpations: Abdomen is soft.      Tenderness: There is no " abdominal tenderness.   Genitourinary:     General: Normal vulva.      Vagina: Normal.      Cervix: Normal.      Uterus: Normal.    Skin:     General: Skin is warm and dry.   Neurological:      General: No focal deficit present.      Mental Status: She is alert.   Psychiatric:         Mood and Affect: Mood normal.         Behavior: Behavior normal.         Thought Content: Thought content normal.         Judgment: Judgment normal.            Assessment and Plan:    Postpartum care  - postpartum precautions reviewed, including PP depression  - feeding:  formula feeding  - contraception discussed including importance of birth spacing; contraception plan: unsure at visit today - was thinking about interval IUD but now declines. Will do trial of ALICE.  Urged patient to consider condoms until on birth control for 2 weeks to prevent unplanned and short interval pregnancy.  Pt expressed understanding.  - repair examined and completely healed without any suture or open areas present    Anxiety/Depression  Reassurance provided to patient that she is doing a great job. These feelings are common and normal. Offered medical therapy but patient declines, would prefer to start with therapist.  Pt reports good support from partner when he is not working.  Encouraged pt to call or go to ED if any significant worsening of Depression symptoms.      Dysuria  Urine culture sent         Follow up 2 weeks

## 2025-07-09 ENCOUNTER — PHARMACY VISIT (OUTPATIENT)
Dept: PHARMACY | Facility: CLINIC | Age: 25
End: 2025-07-09
Payer: MEDICAID

## 2025-07-09 ENCOUNTER — PATIENT MESSAGE (OUTPATIENT)
Facility: CLINIC | Age: 25
End: 2025-07-09
Payer: COMMERCIAL

## 2025-07-09 LAB — BACTERIA UR CULT: NORMAL

## 2025-07-23 NOTE — TELEPHONE ENCOUNTER
"Patient was last sexually active about 1 week ago and her last menstrual was 1 1/2 weeks ago. Patient was advised of providers response.    \"It depends. If the patient has been sexually active since her last visit and not using condoms then she will not be able to safely get the IUD at her visit. If she has not been sexually active and has a negative pregnancy test tomorrow then we can do it\"    English Ocampo    "

## 2025-07-24 ENCOUNTER — APPOINTMENT (OUTPATIENT)
Facility: CLINIC | Age: 25
End: 2025-07-24
Payer: COMMERCIAL

## 2025-07-24 VITALS
DIASTOLIC BLOOD PRESSURE: 78 MMHG | SYSTOLIC BLOOD PRESSURE: 126 MMHG | BODY MASS INDEX: 30.22 KG/M2 | WEIGHT: 177 LBS | HEIGHT: 64 IN

## 2025-07-24 DIAGNOSIS — Z97.5 CONTRACEPTION, DEVICE INTRAUTERINE: ICD-10-CM

## 2025-07-24 DIAGNOSIS — Z30.430 ENCOUNTER FOR INSERTION OF INTRAUTERINE CONTRACEPTIVE DEVICE (IUD): Primary | ICD-10-CM

## 2025-07-24 LAB — PREGNANCY TEST URINE, POC: NEGATIVE

## 2025-07-24 PROCEDURE — 58300 INSERT INTRAUTERINE DEVICE: CPT | Performed by: OBSTETRICS & GYNECOLOGY

## 2025-07-24 PROCEDURE — 81025 URINE PREGNANCY TEST: CPT | Performed by: OBSTETRICS & GYNECOLOGY

## 2025-07-24 PROCEDURE — 3008F BODY MASS INDEX DOCD: CPT | Performed by: OBSTETRICS & GYNECOLOGY

## 2025-07-24 ASSESSMENT — ENCOUNTER SYMPTOMS
CHILLS: 0
SLEEP DISTURBANCE: 0
ABDOMINAL DISTENTION: 0
CONSTIPATION: 0
DYSURIA: 0
BLOOD IN STOOL: 0
NAUSEA: 0
APPETITE CHANGE: 0
DIARRHEA: 0
FATIGUE: 0
FREQUENCY: 0
FLANK PAIN: 0
SHORTNESS OF BREATH: 0
UNEXPECTED WEIGHT CHANGE: 0
ABDOMINAL PAIN: 0
COLOR CHANGE: 0
BACK PAIN: 0
VOMITING: 0
HEMATURIA: 0
FEVER: 0

## 2025-07-24 NOTE — PROGRESS NOTES
"Abelardo Knapp is a 25 y.o.  here for follow up    Pt reports continuing to feel somewhat down and depressed but most days feels \"even\".  She reports she is feeling better than the first few weeks. Does have appointment set up through Behavioral [facility] for counseling services.     She would like IUD for birth control.  LMP maybe a week and a half ago. Last intercourse last week and used a condom.  No unprotected sex since LMP.    Vaginal repair feeling back to normal.  No longer having any throbbing or issues with urination.      Past medical and surgical history reviewed.     Obstetric History  OB History    Para Term  AB Living   1 1 1 0 0 1   SAB IAB Ectopic Multiple Live Births   0 0 0 0 1      # Outcome Date GA Lbr Aron/2nd Weight Sex Type Anes PTL Lv   1 Term 25 38w6d 06:48 / 00:10 2.64 kg F Vag-Spont EPI  GIANCARLO        Past Medical History  She has a past medical history of Encounter for gynecological examination (general) (routine) without abnormal findings (2022), Encounter for immunization (2015), Encounter for immunization (2016), Encounter for immunization (10/31/2016), Herpes, and Other specified health status (2014).    Surgical History  She has a past surgical history that includes Other surgical history (2019).     Social History  She reports that she has never smoked. She has never used smokeless tobacco. She reports that she does not currently use alcohol. She reports that she does not currently use drugs after having used the following drugs: Marijuana.    Family History  Family History[1]      /78   Ht 1.626 m (5' 4\")   Wt 80.3 kg (177 lb)   LMP 2024 (Exact Date)   Breastfeeding Unknown   BMI 30.38 kg/m²   Patient's last menstrual period was 2024 (exact date).    Review of Systems   Constitutional:  Negative for appetite change, chills, fatigue, fever and unexpected weight change.   Respiratory:  Negative for shortness " of breath.    Cardiovascular:  Negative for chest pain.   Gastrointestinal:  Negative for abdominal distention, abdominal pain, blood in stool, constipation, diarrhea, nausea and vomiting.   Endocrine: Negative for cold intolerance and heat intolerance.   Genitourinary:  Negative for dyspareunia, dysuria, flank pain, frequency, genital sores, hematuria, menstrual problem, pelvic pain, urgency, vaginal bleeding, vaginal discharge and vaginal pain.   Musculoskeletal:  Negative for back pain.   Skin:  Negative for color change.   Psychiatric/Behavioral:  Negative for sleep disturbance.        Physical Exam  Constitutional:       Appearance: Normal appearance.   Genitourinary:     General: Normal vulva.      Vagina: Normal.      Cervix: Normal.     Skin:     General: Skin is warm and dry.     Psychiatric:         Mood and Affect: Mood normal.         Behavior: Behavior normal.       Patient ID: Abelardo Knapp is a 25 y.o. female.    IUD Management    Performed by: Heather LEA MD  Authorized by: Heather LEA MD    Procedure: IUD insertion    Consent obtained by patient, parent, or legal power of  - including discussion of procedure risks and benefits, patient questions answered, and patient education provided: yes    Pregnancy risk: reasonably certain the patient is not pregnant    Date/Time of Insertion:  7/24/2025 3:32 PM  Immediately prior to procedure a time out was called: yes    Pelvic exam performed: yes    Speculum placed in vagina: yes    Cervix cleaned and prepped: yes    Tenaculum/Allis/Ring Forceps applied to cervix: yes    Uterus sound depth (cm):  8  Cervix manually dilated: no    IUD inserted without complications: yes    OSM: levonorgestreL 17.5 mcg/24 hr (5 yrs) 19.5 mg  Strings trimmed to (cm):  2  Patient tolerated procedure well: yes    Intended removal date: 5 years          Assessment and Plan:    Pt overall doing well.   It was noted her [facility] visit is with   [facility] which is incorrect scheduling.  Will work to change this visit to an appropriate therapist within [facility]al [facility] Dept.  Pt aware.    IUD placement   Kyleena IUD placed without complication.  Pt tolerated procedure well.   Discussed possible irregular bleeding for up to 3 months.    Discussed possible cramping for up to a few days.  May use tylenol and/or ibuprofen as needed.   Use backup method of birth control x 7 days.   Please return for follow up exam in 6-8 weeks.          [1] No family history on file.

## 2025-07-30 ENCOUNTER — APPOINTMENT (OUTPATIENT)
Facility: CLINIC | Age: 25
End: 2025-07-30
Payer: COMMERCIAL

## 2025-07-31 PROCEDURE — RXMED WILLOW AMBULATORY MEDICATION CHARGE

## 2025-08-01 ENCOUNTER — PHARMACY VISIT (OUTPATIENT)
Dept: PHARMACY | Facility: CLINIC | Age: 25
End: 2025-08-01
Payer: MEDICAID

## 2025-08-19 ENCOUNTER — APPOINTMENT (OUTPATIENT)
Dept: BEHAVIORAL HEALTH | Facility: CLINIC | Age: 25
End: 2025-08-19
Payer: COMMERCIAL

## 2025-08-19 PROCEDURE — 90791 PSYCH DIAGNOSTIC EVALUATION: CPT | Performed by: PSYCHOLOGIST

## 2025-09-04 ENCOUNTER — APPOINTMENT (OUTPATIENT)
Facility: CLINIC | Age: 25
End: 2025-09-04
Payer: COMMERCIAL

## 2025-09-18 ENCOUNTER — APPOINTMENT (OUTPATIENT)
Facility: CLINIC | Age: 25
End: 2025-09-18
Payer: COMMERCIAL